# Patient Record
Sex: MALE | Race: WHITE | Employment: UNEMPLOYED | URBAN - METROPOLITAN AREA
[De-identification: names, ages, dates, MRNs, and addresses within clinical notes are randomized per-mention and may not be internally consistent; named-entity substitution may affect disease eponyms.]

---

## 2022-06-26 ENCOUNTER — APPOINTMENT (OUTPATIENT)
Dept: GENERAL RADIOLOGY | Age: 42
DRG: 283 | End: 2022-06-26
Payer: MEDICAID

## 2022-06-26 ENCOUNTER — HOSPITAL ENCOUNTER (INPATIENT)
Age: 42
LOS: 1 days | Discharge: ANOTHER HOSPITAL | DRG: 283 | End: 2022-06-27
Attending: EMERGENCY MEDICINE | Admitting: INTERNAL MEDICINE
Payer: MEDICAID

## 2022-06-26 VITALS
HEART RATE: 59 BPM | DIASTOLIC BLOOD PRESSURE: 72 MMHG | HEIGHT: 67 IN | TEMPERATURE: 98.5 F | SYSTOLIC BLOOD PRESSURE: 113 MMHG | OXYGEN SATURATION: 100 % | BODY MASS INDEX: 25.9 KG/M2 | WEIGHT: 165 LBS | RESPIRATION RATE: 16 BRPM

## 2022-06-26 DIAGNOSIS — R10.10 UPPER ABDOMINAL PAIN: ICD-10-CM

## 2022-06-26 DIAGNOSIS — B17.9 ACUTE HEPATITIS: Primary | ICD-10-CM

## 2022-06-26 DIAGNOSIS — R74.01 TRANSAMINITIS: ICD-10-CM

## 2022-06-26 DIAGNOSIS — R11.10 VOMITING IN ADULT: ICD-10-CM

## 2022-06-26 DIAGNOSIS — R17 SCLERAL ICTERUS: ICD-10-CM

## 2022-06-26 PROBLEM — K75.9 HEPATITIS: Status: ACTIVE | Noted: 2022-06-26

## 2022-06-26 LAB
A/G RATIO: 1.4 (ref 1.1–2.2)
ACETAMINOPHEN LEVEL: <5 UG/ML (ref 10–30)
ALBUMIN SERPL-MCNC: 3.8 G/DL (ref 3.4–5)
ALP BLD-CCNC: 339 U/L (ref 40–129)
ALT SERPL-CCNC: 1754 U/L (ref 10–40)
ANION GAP SERPL CALCULATED.3IONS-SCNC: 10 MMOL/L (ref 3–16)
AST SERPL-CCNC: 937 U/L (ref 15–37)
BASOPHILS ABSOLUTE: 0.1 K/UL (ref 0–0.2)
BASOPHILS RELATIVE PERCENT: 0.8 %
BILIRUB SERPL-MCNC: 9.8 MG/DL (ref 0–1)
BILIRUBIN DIRECT: 7.8 MG/DL (ref 0–0.3)
BILIRUBIN URINE: ABNORMAL
BILIRUBIN, INDIRECT: 2 MG/DL (ref 0–1)
BLOOD, URINE: NEGATIVE
BUN BLDV-MCNC: 9 MG/DL (ref 7–20)
CALCIUM SERPL-MCNC: 8.9 MG/DL (ref 8.3–10.6)
CHLORIDE BLD-SCNC: 104 MMOL/L (ref 99–110)
CLARITY: CLEAR
CO2: 23 MMOL/L (ref 21–32)
COLOR: ABNORMAL
CREAT SERPL-MCNC: <0.5 MG/DL (ref 0.9–1.3)
EOSINOPHILS ABSOLUTE: 0.4 K/UL (ref 0–0.6)
EOSINOPHILS RELATIVE PERCENT: 5 %
GFR AFRICAN AMERICAN: >60
GFR NON-AFRICAN AMERICAN: >60
GLUCOSE BLD-MCNC: 111 MG/DL (ref 70–99)
GLUCOSE URINE: NEGATIVE MG/DL
HCT VFR BLD CALC: 42 % (ref 40.5–52.5)
HEMOGLOBIN: 14.4 G/DL (ref 13.5–17.5)
INR BLD: 1.08 (ref 0.87–1.14)
KETONES, URINE: NEGATIVE MG/DL
LEUKOCYTE ESTERASE, URINE: NEGATIVE
LIPASE: 33 U/L (ref 13–60)
LYMPHOCYTES ABSOLUTE: 1.4 K/UL (ref 1–5.1)
LYMPHOCYTES RELATIVE PERCENT: 16.7 %
MCH RBC QN AUTO: 31.1 PG (ref 26–34)
MCHC RBC AUTO-ENTMCNC: 34.4 G/DL (ref 31–36)
MCV RBC AUTO: 90.5 FL (ref 80–100)
MICROSCOPIC EXAMINATION: ABNORMAL
MONOCYTES ABSOLUTE: 0.8 K/UL (ref 0–1.3)
MONOCYTES RELATIVE PERCENT: 9.3 %
NEUTROPHILS ABSOLUTE: 5.6 K/UL (ref 1.7–7.7)
NEUTROPHILS RELATIVE PERCENT: 68.2 %
NITRITE, URINE: NEGATIVE
PDW BLD-RTO: 16.4 % (ref 12.4–15.4)
PH UA: 6.5 (ref 5–8)
PLATELET # BLD: 282 K/UL (ref 135–450)
PMV BLD AUTO: 7.8 FL (ref 5–10.5)
POTASSIUM REFLEX MAGNESIUM: 3.8 MMOL/L (ref 3.5–5.1)
PROTEIN UA: NEGATIVE MG/DL
PROTHROMBIN TIME: 13.9 SEC (ref 11.7–14.5)
RBC # BLD: 4.64 M/UL (ref 4.2–5.9)
SARS-COV-2, NAAT: NOT DETECTED
SODIUM BLD-SCNC: 137 MMOL/L (ref 136–145)
SPECIFIC GRAVITY UA: 1.02 (ref 1–1.03)
TOTAL PROTEIN: 6.6 G/DL (ref 6.4–8.2)
TROPONIN: <0.01 NG/ML
URINE TYPE: ABNORMAL
UROBILINOGEN, URINE: 1 E.U./DL
WBC # BLD: 8.2 K/UL (ref 4–11)

## 2022-06-26 PROCEDURE — 80143 DRUG ASSAY ACETAMINOPHEN: CPT

## 2022-06-26 PROCEDURE — 87341 HEP B SURFACE AG NEUTRLZJ IA: CPT

## 2022-06-26 PROCEDURE — 85025 COMPLETE CBC W/AUTO DIFF WBC: CPT

## 2022-06-26 PROCEDURE — 2500000003 HC RX 250 WO HCPCS: Performed by: PHYSICIAN ASSISTANT

## 2022-06-26 PROCEDURE — 93005 ELECTROCARDIOGRAM TRACING: CPT | Performed by: PHYSICIAN ASSISTANT

## 2022-06-26 PROCEDURE — 71045 X-RAY EXAM CHEST 1 VIEW: CPT

## 2022-06-26 PROCEDURE — G0378 HOSPITAL OBSERVATION PER HR: HCPCS

## 2022-06-26 PROCEDURE — 80074 ACUTE HEPATITIS PANEL: CPT

## 2022-06-26 PROCEDURE — 6360000002 HC RX W HCPCS: Performed by: PHYSICIAN ASSISTANT

## 2022-06-26 PROCEDURE — 85610 PROTHROMBIN TIME: CPT

## 2022-06-26 PROCEDURE — 96375 TX/PRO/DX INJ NEW DRUG ADDON: CPT

## 2022-06-26 PROCEDURE — 84484 ASSAY OF TROPONIN QUANT: CPT

## 2022-06-26 PROCEDURE — 81003 URINALYSIS AUTO W/O SCOPE: CPT

## 2022-06-26 PROCEDURE — 99285 EMERGENCY DEPT VISIT HI MDM: CPT

## 2022-06-26 PROCEDURE — 96374 THER/PROPH/DIAG INJ IV PUSH: CPT

## 2022-06-26 PROCEDURE — 83690 ASSAY OF LIPASE: CPT

## 2022-06-26 PROCEDURE — 36415 COLL VENOUS BLD VENIPUNCTURE: CPT

## 2022-06-26 PROCEDURE — 2580000003 HC RX 258: Performed by: PHYSICIAN ASSISTANT

## 2022-06-26 PROCEDURE — 1200000000 HC SEMI PRIVATE

## 2022-06-26 PROCEDURE — 87635 SARS-COV-2 COVID-19 AMP PRB: CPT

## 2022-06-26 PROCEDURE — 96361 HYDRATE IV INFUSION ADD-ON: CPT

## 2022-06-26 PROCEDURE — 80053 COMPREHEN METABOLIC PANEL: CPT

## 2022-06-26 RX ORDER — ENOXAPARIN SODIUM 100 MG/ML
40 INJECTION SUBCUTANEOUS DAILY
Status: CANCELLED | OUTPATIENT
Start: 2022-06-26

## 2022-06-26 RX ORDER — 0.9 % SODIUM CHLORIDE 0.9 %
1000 INTRAVENOUS SOLUTION INTRAVENOUS ONCE
Status: COMPLETED | OUTPATIENT
Start: 2022-06-26 | End: 2022-06-26

## 2022-06-26 RX ORDER — SODIUM CHLORIDE 9 MG/ML
INJECTION, SOLUTION INTRAVENOUS PRN
Status: CANCELLED | OUTPATIENT
Start: 2022-06-26

## 2022-06-26 RX ORDER — FAMOTIDINE 10 MG/ML
20 INJECTION, SOLUTION INTRAVENOUS ONCE
Status: COMPLETED | OUTPATIENT
Start: 2022-06-26 | End: 2022-06-26

## 2022-06-26 RX ORDER — SODIUM CHLORIDE 0.9 % (FLUSH) 0.9 %
5-40 SYRINGE (ML) INJECTION EVERY 12 HOURS SCHEDULED
Status: CANCELLED | OUTPATIENT
Start: 2022-06-26

## 2022-06-26 RX ORDER — POLYETHYLENE GLYCOL 3350 17 G/17G
17 POWDER, FOR SOLUTION ORAL DAILY PRN
Status: CANCELLED | OUTPATIENT
Start: 2022-06-26

## 2022-06-26 RX ORDER — SODIUM CHLORIDE 0.9 % (FLUSH) 0.9 %
5-40 SYRINGE (ML) INJECTION PRN
Status: CANCELLED | OUTPATIENT
Start: 2022-06-26

## 2022-06-26 RX ORDER — ONDANSETRON 4 MG/1
4 TABLET, ORALLY DISINTEGRATING ORAL EVERY 8 HOURS PRN
Status: CANCELLED | OUTPATIENT
Start: 2022-06-26

## 2022-06-26 RX ORDER — ONDANSETRON 2 MG/ML
4 INJECTION INTRAMUSCULAR; INTRAVENOUS EVERY 6 HOURS PRN
Status: CANCELLED | OUTPATIENT
Start: 2022-06-26

## 2022-06-26 RX ORDER — ONDANSETRON 2 MG/ML
4 INJECTION INTRAMUSCULAR; INTRAVENOUS ONCE
Status: COMPLETED | OUTPATIENT
Start: 2022-06-26 | End: 2022-06-26

## 2022-06-26 RX ADMIN — FAMOTIDINE 20 MG: 10 INJECTION, SOLUTION INTRAVENOUS at 16:37

## 2022-06-26 RX ADMIN — SODIUM CHLORIDE 1000 ML: 9 INJECTION, SOLUTION INTRAVENOUS at 16:37

## 2022-06-26 RX ADMIN — ONDANSETRON HYDROCHLORIDE 4 MG: 2 INJECTION, SOLUTION INTRAMUSCULAR; INTRAVENOUS at 16:37

## 2022-06-26 ASSESSMENT — ENCOUNTER SYMPTOMS
ABDOMINAL PAIN: 1
BLOOD IN STOOL: 0
NAUSEA: 1
SHORTNESS OF BREATH: 0
DIARRHEA: 0
VOMITING: 1

## 2022-06-26 ASSESSMENT — PAIN SCALES - GENERAL: PAINLEVEL_OUTOF10: 7

## 2022-06-26 ASSESSMENT — PAIN - FUNCTIONAL ASSESSMENT: PAIN_FUNCTIONAL_ASSESSMENT: 0-10

## 2022-06-26 ASSESSMENT — PAIN DESCRIPTION - LOCATION: LOCATION: ABDOMEN

## 2022-06-26 NOTE — ED NOTES
Report called to Naun [facility] ED charge RN also called and given update on patient status.       Jalyn Jones RN  06/26/22 5684

## 2022-06-26 NOTE — ED PROVIDER NOTES
I independently performed a history and physical on Kelle Murdock. All diagnostic, treatment, and disposition decisions were made by myself in conjunction with the advanced practice provider. I have participated in the medical decision making and directed the treatment plan and disposition of the patient. For further details of [facility]anna [facility] emergency department encounter, please see the advanced practice provider's documentation. CHIEF COMPLAINT  Chief Complaint   Patient presents with    Abdominal Pain     seen at [facility] ER told he was having \"liver issues\" due to make appt with GI doctor, states his skin is turning yellow and abd pain is increasing. Briefly, Kelle Murdock is a 43 y.o. male  who presents to the ED complaining of abdominal pain nausea yellow skin    FOCUSED PHYSICAL EXAMINATION  /63   Pulse 57   Temp 98.5 °F (36.9 °C) (Oral)   Resp 16   Ht 5' 7\" (1.702 m)   Wt 165 lb (74.8 kg)   SpO2 100%   BMI 25.84 kg/m²      Focused physical examination:  General appearance:  Cooperative. No acute distress. Skin:  Warm. Dry. Jaundice skin  Eye:  Extraocular movements intact. Scleral icterus  Ears, nose, mouth and throat:  Oral mucosa moist,  Neck:  Trachea midline. Heart:  Regular rate and rhythm  Perfusion:  intact  Respiratory:  Lungs clear to auscultation bilaterally. Respirations nonlabored. Abdominal:   Non distended. Mild right upper quadrant tenderness  Neurological:  Alert and oriented x 3. Moves all extremities spontaneously  Musculoskeletal:   Normal ROM, no deformities          Psychiatric:  Normal mood    MDM: Patient presents today with new onset jaundice. Outpatient CT scan performed only days ago shows hepatosplenomegaly. Laboratory studies no elevated bilirubin and liver function studies. Outpatient hepatitis screen shows likely acute hepatitis B infection as underlying etiology.   Contact GI and plan to likely admit    During the patient's ED course, the patient was given:  Medications   0.9 % sodium chloride bolus (1,000 mLs IntraVENous New Bag 6/26/22 1637)   ondansetron (ZOFRAN) injection 4 mg (4 mg IntraVENous Given 6/26/22 1637)   famotidine (PEPCID) injection 20 mg (20 mg IntraVENous Given 6/26/22 1637)      This chart was created using Dragon dictation software. Efforts were made by me to ensure accuracy, however some errors may be present due to limitations of this technology.             Rosalee Koo MD  06/26/22 0866

## 2022-06-26 NOTE — ED NOTES
Pt does not want to wait for transport due to extreme long wait (after 0300) pt is going to go home and  someone then go to hospitl. IV removed.       Gaudencio Perry RN  06/26/22 1245

## 2022-06-27 ENCOUNTER — APPOINTMENT (OUTPATIENT)
Dept: CT IMAGING | Age: 42
End: 2022-06-27
Payer: MEDICAID

## 2022-06-27 ENCOUNTER — HOSPITAL ENCOUNTER (OUTPATIENT)
Age: 42
Setting detail: OBSERVATION
Discharge: HOME | End: 2022-06-30
Attending: EMERGENCY MEDICINE | Admitting: INTERNAL MEDICINE
Payer: MEDICAID

## 2022-06-27 ENCOUNTER — APPOINTMENT (OUTPATIENT)
Dept: ULTRASOUND IMAGING | Age: 42
End: 2022-06-27
Payer: MEDICAID

## 2022-06-27 ENCOUNTER — APPOINTMENT (OUTPATIENT)
Dept: GENERAL RADIOLOGY | Age: 42
End: 2022-06-27
Payer: MEDICAID

## 2022-06-27 DIAGNOSIS — R79.89 ABNORMAL LFTS: Primary | ICD-10-CM

## 2022-06-27 DIAGNOSIS — R17 JAUNDICE, NON-NEONATAL: ICD-10-CM

## 2022-06-27 DIAGNOSIS — B16.9: ICD-10-CM

## 2022-06-27 DIAGNOSIS — B17.9 ACUTE HEPATITIS: ICD-10-CM

## 2022-06-27 LAB
A/G RATIO: 1.4 (ref 1.1–2.2)
ALBUMIN SERPL-MCNC: 3.8 G/DL (ref 3.4–5)
ALP BLD-CCNC: 317 U/L (ref 40–129)
ALT SERPL-CCNC: 1770 U/L (ref 10–40)
AMMONIA: 21 UMOL/L (ref 16–60)
ANION GAP SERPL CALCULATED.3IONS-SCNC: 10 MMOL/L (ref 3–16)
APTT: 39.1 SEC (ref 23–34.3)
AST SERPL-CCNC: 878 U/L (ref 15–37)
BASOPHILS ABSOLUTE: 0 K/UL (ref 0–0.2)
BASOPHILS RELATIVE PERCENT: 0.6 %
BILIRUB SERPL-MCNC: 9.7 MG/DL (ref 0–1)
BUN BLDV-MCNC: 10 MG/DL (ref 7–20)
CALCIUM SERPL-MCNC: 8.6 MG/DL (ref 8.3–10.6)
CHLORIDE BLD-SCNC: 106 MMOL/L (ref 99–110)
CO2: 23 MMOL/L (ref 21–32)
CREAT SERPL-MCNC: 0.6 MG/DL (ref 0.9–1.3)
EKG ATRIAL RATE: 45 BPM
EKG DIAGNOSIS: NORMAL
EKG P AXIS: 34 DEGREES
EKG P-R INTERVAL: 158 MS
EKG Q-T INTERVAL: 432 MS
EKG QRS DURATION: 108 MS
EKG QTC CALCULATION (BAZETT): 373 MS
EKG R AXIS: 77 DEGREES
EKG T AXIS: 34 DEGREES
EKG VENTRICULAR RATE: 45 BPM
EOSINOPHILS ABSOLUTE: 0.3 K/UL (ref 0–0.6)
EOSINOPHILS RELATIVE PERCENT: 4.5 %
ETHANOL: NORMAL MG/DL (ref 0–0.08)
GFR AFRICAN AMERICAN: >60
GFR NON-AFRICAN AMERICAN: >60
GLUCOSE BLD-MCNC: 82 MG/DL (ref 70–99)
HAV IGM SER IA-ACNC: ABNORMAL
HCT VFR BLD CALC: 41.3 % (ref 40.5–52.5)
HEMOGLOBIN: 13.9 G/DL (ref 13.5–17.5)
HEPATITIS B CORE IGM ANTIBODY: REACTIVE
HEPATITIS B SURFACE ANTIGEN INTERPRETATION: REACTIVE
HEPATITIS C ANTIBODY INTERPRETATION: ABNORMAL
INFLUENZA A: NOT DETECTED
INFLUENZA B: NOT DETECTED
INR BLD: 1.06 (ref 0.87–1.14)
LACTIC ACID: 0.9 MMOL/L (ref 0.4–2)
LIPASE: 28 U/L (ref 13–60)
LYMPHOCYTES ABSOLUTE: 1.3 K/UL (ref 1–5.1)
LYMPHOCYTES RELATIVE PERCENT: 16.9 %
MCH RBC QN AUTO: 30.6 PG (ref 26–34)
MCHC RBC AUTO-ENTMCNC: 33.7 G/DL (ref 31–36)
MCV RBC AUTO: 91 FL (ref 80–100)
MONOCYTES ABSOLUTE: 0.9 K/UL (ref 0–1.3)
MONOCYTES RELATIVE PERCENT: 11.2 %
NEUTROPHILS ABSOLUTE: 5.1 K/UL (ref 1.7–7.7)
NEUTROPHILS RELATIVE PERCENT: 66.8 %
PDW BLD-RTO: 17.1 % (ref 12.4–15.4)
PLATELET # BLD: 264 K/UL (ref 135–450)
PMV BLD AUTO: 8.3 FL (ref 5–10.5)
POTASSIUM REFLEX MAGNESIUM: 3.8 MMOL/L (ref 3.5–5.1)
PROTHROMBIN TIME: 13.6 SEC (ref 11.7–14.5)
RBC # BLD: 4.54 M/UL (ref 4.2–5.9)
SARS-COV-2 RNA, RT PCR: NOT DETECTED
SODIUM BLD-SCNC: 139 MMOL/L (ref 136–145)
TOTAL PROTEIN: 6.5 G/DL (ref 6.4–8.2)
WBC # BLD: 7.7 K/UL (ref 4–11)

## 2022-06-27 PROCEDURE — 76705 ECHO EXAM OF ABDOMEN: CPT

## 2022-06-27 PROCEDURE — 74177 CT ABD & PELVIS W/CONTRAST: CPT

## 2022-06-27 PROCEDURE — 82077 ASSAY SPEC XCP UR&BREATH IA: CPT

## 2022-06-27 PROCEDURE — 99285 EMERGENCY DEPT VISIT HI MDM: CPT

## 2022-06-27 PROCEDURE — 93010 ELECTROCARDIOGRAM REPORT: CPT | Performed by: INTERNAL MEDICINE

## 2022-06-27 PROCEDURE — 83690 ASSAY OF LIPASE: CPT

## 2022-06-27 PROCEDURE — 82140 ASSAY OF AMMONIA: CPT

## 2022-06-27 PROCEDURE — 6360000004 HC RX CONTRAST MEDICATION: Performed by: NURSE PRACTITIONER

## 2022-06-27 PROCEDURE — 87636 SARSCOV2 & INF A&B AMP PRB: CPT

## 2022-06-27 PROCEDURE — 83605 ASSAY OF LACTIC ACID: CPT

## 2022-06-27 PROCEDURE — 2580000003 HC RX 258: Performed by: INTERNAL MEDICINE

## 2022-06-27 PROCEDURE — 85025 COMPLETE CBC W/AUTO DIFF WBC: CPT

## 2022-06-27 PROCEDURE — 80053 COMPREHEN METABOLIC PANEL: CPT

## 2022-06-27 PROCEDURE — 6360000002 HC RX W HCPCS: Performed by: NURSE PRACTITIONER

## 2022-06-27 PROCEDURE — 96375 TX/PRO/DX INJ NEW DRUG ADDON: CPT

## 2022-06-27 PROCEDURE — G0378 HOSPITAL OBSERVATION PER HR: HCPCS

## 2022-06-27 PROCEDURE — 85730 THROMBOPLASTIN TIME PARTIAL: CPT

## 2022-06-27 PROCEDURE — 2580000003 HC RX 258: Performed by: NURSE PRACTITIONER

## 2022-06-27 PROCEDURE — 36415 COLL VENOUS BLD VENIPUNCTURE: CPT

## 2022-06-27 PROCEDURE — 96365 THER/PROPH/DIAG IV INF INIT: CPT

## 2022-06-27 PROCEDURE — 71045 X-RAY EXAM CHEST 1 VIEW: CPT

## 2022-06-27 PROCEDURE — 85610 PROTHROMBIN TIME: CPT

## 2022-06-27 RX ORDER — 0.9 % SODIUM CHLORIDE 0.9 %
1000 INTRAVENOUS SOLUTION INTRAVENOUS ONCE
Status: COMPLETED | OUTPATIENT
Start: 2022-06-27 | End: 2022-06-27

## 2022-06-27 RX ORDER — ONDANSETRON 2 MG/ML
4 INJECTION INTRAMUSCULAR; INTRAVENOUS EVERY 6 HOURS PRN
Status: DISCONTINUED | OUTPATIENT
Start: 2022-06-27 | End: 2022-06-30 | Stop reason: HOSPADM

## 2022-06-27 RX ORDER — ONDANSETRON 2 MG/ML
4 INJECTION INTRAMUSCULAR; INTRAVENOUS ONCE
Status: COMPLETED | OUTPATIENT
Start: 2022-06-27 | End: 2022-06-27

## 2022-06-27 RX ORDER — ENOXAPARIN SODIUM 100 MG/ML
40 INJECTION SUBCUTANEOUS DAILY
Status: DISCONTINUED | OUTPATIENT
Start: 2022-06-28 | End: 2022-06-30 | Stop reason: HOSPADM

## 2022-06-27 RX ORDER — SODIUM CHLORIDE, SODIUM LACTATE, POTASSIUM CHLORIDE, CALCIUM CHLORIDE 600; 310; 30; 20 MG/100ML; MG/100ML; MG/100ML; MG/100ML
INJECTION, SOLUTION INTRAVENOUS CONTINUOUS
Status: DISCONTINUED | OUTPATIENT
Start: 2022-06-27 | End: 2022-06-30 | Stop reason: HOSPADM

## 2022-06-27 RX ORDER — SODIUM CHLORIDE 0.9 % (FLUSH) 0.9 %
5-40 SYRINGE (ML) INJECTION PRN
Status: DISCONTINUED | OUTPATIENT
Start: 2022-06-27 | End: 2022-06-30 | Stop reason: HOSPADM

## 2022-06-27 RX ORDER — SODIUM CHLORIDE 9 MG/ML
INJECTION, SOLUTION INTRAVENOUS PRN
Status: DISCONTINUED | OUTPATIENT
Start: 2022-06-27 | End: 2022-06-30 | Stop reason: HOSPADM

## 2022-06-27 RX ORDER — SODIUM CHLORIDE 0.9 % (FLUSH) 0.9 %
5-40 SYRINGE (ML) INJECTION EVERY 12 HOURS SCHEDULED
Status: DISCONTINUED | OUTPATIENT
Start: 2022-06-27 | End: 2022-06-30 | Stop reason: HOSPADM

## 2022-06-27 RX ADMIN — SODIUM CHLORIDE 1000 ML: 9 INJECTION, SOLUTION INTRAVENOUS at 22:06

## 2022-06-27 RX ADMIN — PIPERACILLIN AND TAZOBACTAM 3375 MG: 3; .375 INJECTION, POWDER, LYOPHILIZED, FOR SOLUTION INTRAVENOUS at 22:20

## 2022-06-27 RX ADMIN — IOPAMIDOL 75 ML: 755 INJECTION, SOLUTION INTRAVENOUS at 19:39

## 2022-06-27 RX ADMIN — ONDANSETRON HYDROCHLORIDE 4 MG: 2 INJECTION, SOLUTION INTRAMUSCULAR; INTRAVENOUS at 22:06

## 2022-06-27 RX ADMIN — SODIUM CHLORIDE, POTASSIUM CHLORIDE, SODIUM LACTATE AND CALCIUM CHLORIDE: 600; 310; 30; 20 INJECTION, SOLUTION INTRAVENOUS at 23:16

## 2022-06-27 RX ADMIN — Medication 10 ML: at 23:16

## 2022-06-27 ASSESSMENT — ENCOUNTER SYMPTOMS
COLOR CHANGE: 1
DIARRHEA: 0
SORE THROAT: 0
COUGH: 0
BLOOD IN STOOL: 0
BACK PAIN: 0
VOMITING: 0
ABDOMINAL PAIN: 1
SHORTNESS OF BREATH: 0
NAUSEA: 0
EYE PAIN: 0
RHINORRHEA: 0

## 2022-06-27 ASSESSMENT — LIFESTYLE VARIABLES
HOW MANY STANDARD DRINKS CONTAINING ALCOHOL DO YOU HAVE ON A TYPICAL DAY: 3 OR 4
HOW MANY STANDARD DRINKS CONTAINING ALCOHOL DO YOU HAVE ON A TYPICAL DAY: 3 OR 4
HOW OFTEN DO YOU HAVE A DRINK CONTAINING ALCOHOL: MONTHLY OR LESS
HOW OFTEN DO YOU HAVE A DRINK CONTAINING ALCOHOL: MONTHLY OR LESS

## 2022-06-27 ASSESSMENT — PAIN DESCRIPTION - LOCATION: LOCATION: ABDOMEN

## 2022-06-27 ASSESSMENT — PAIN SCALES - GENERAL: PAINLEVEL_OUTOF10: 7

## 2022-06-27 ASSESSMENT — PAIN - FUNCTIONAL ASSESSMENT: PAIN_FUNCTIONAL_ASSESSMENT: 0-10

## 2022-06-27 NOTE — PROGRESS NOTES
Patient has never arrived to [facility]. Attempted phone call but no voicemail has been set up. Spoke with Dr. Arpit Keenan, if patient shows up he will need to be re evaluated in the ED since it has been 12hours since admit orders.

## 2022-06-27 NOTE — ED PROVIDER NOTES
[address] 50        Pt Name: Amberly Bedolla  MRN: [id number]  Demetriagfgino 1980  Date of evaluation: 6/27/2022  Provider: TIBURCIO Car CNP  PCP: No primary care provider on file. Note Started: 5:49 PM EDT      ELAINE. I have evaluated this patient. My supervising physician was available for consultation. Triage CHIEF COMPLAINT       Chief Complaint   Patient presents with    Jaundice     pt reports was sent from [facility] last night for direct admit. states went back to the house and did not have gas to get back to [place]. dx with hepatitis, jaundice          HISTORY OF PRESENT ILLNESS   (Location/Symptom, Timing/Onset, Context/Setting, Quality, Duration, Modifying Factors, Severity)  Note limiting factors. Chief Complaint: Jaundice and abdominal pain    Art Alvarado is a 89[address] y.o. male who presents to the emerge department symptoms of jaundice and abdominal pain. The patient reported the symptoms of jaundice began about a week ago. He reported that he since has had symptoms of right upper quadrant abdominal pain. Reported that he was seen at [facility] [place] and they had provided him transfer here to be evaluated. The patient since then has not shown up for his hospitalization and refusing this transportation. States he is here now to be admitted. He does have a history of COPD and is a current smoker. Denies any history of excessive alcohol use but does occasionally drink alcohol. .  Denies history of IV drug use. Denies any symptoms of chest pain or shortness of breath. No back pain or flank pain. Nursing Notes were all reviewed and agreed with or any disagreements were addressed in the HPI. REVIEW OF SYSTEMS    (2-9 systems for level 4, 10 or more for level 5)     Review of Systems   Constitutional: Negative for chills, diaphoresis and fever. HENT: Negative for congestion, ear pain, rhinorrhea and sore throat.     Eyes: Not on file    Lack of Transportation (Non-Medical): Not on file   Physical Activity:     Days of Exercise per Week: Not on file    Minutes of Exercise per Session: Not on file   Stress:     Feeling of Stress : Not on file   Social Connections:     Frequency of Communication with Friends and Family: Not on file    Frequency of Social Gatherings with Friends and Family: Not on file    Attends Congregational Services: Not on file    Active Member of [employer] or Organizations: Not on file    Attends Club or Organization Meetings: Not on file    Marital Status: Not on file   Intimate Partner Violence:     Fear of Current or Ex-Partner: Not on file    Emotionally Abused: Not on file    Physically Abused: Not on file    Sexually Abused: Not on file   Housing Stability:     Unable to Pay for Housing in the Last Year: Not on file    Number of [place] in the Last Year: Not on file    Unstable Housing in the Last Year: Not on file       SCREENINGS    [place] Coma Scale  Eye Opening: Spontaneous  Best Verbal Response: Oriented  Best Motor Response: Obeys commands  [place] Coma Scale Score: 15        PHYSICAL EXAM    (up to 7 for level 4, 8 or more for level 5)     ED Triage Vitals [06/27/22 1619]   BP Temp Temp Source Heart Rate Resp SpO2 Height Weight   117/71 98.4 °F (36.9 °C) Oral 66 16 100 % 5' 7\" (1.702 m) 165 lb (74.8 kg)       Physical Exam  Vitals and nursing note reviewed. Constitutional:       Appearance: Normal appearance. He is not toxic-appearing or diaphoretic. HENT:      Head: Normocephalic and atraumatic. Nose: Nose normal.   Eyes:      General: Scleral icterus present. Right eye: No discharge. Left eye: No discharge. Cardiovascular:      Rate and Rhythm: Normal rate and regular rhythm. Pulses: Normal pulses. Heart sounds: No murmur heard. Pulmonary:      Effort: Pulmonary effort is normal. No respiratory distress. Breath sounds:  No wheezing or rhonchi. Abdominal:      Palpations: Abdomen is soft. Tenderness: There is abdominal tenderness in the right upper quadrant. There is no guarding or rebound. Musculoskeletal:         General: Normal range of motion. Cervical back: Normal range of motion and neck supple. Skin:     General: Skin is warm and dry. Coloration: Skin is jaundiced. Neurological:      General: No focal deficit present. Mental Status: He is alert and oriented to person, place, and time. Psychiatric:         Mood and Affect: Mood normal.         Behavior: Behavior normal.         DIAGNOSTIC RESULTS   LABS:    Labs Reviewed   CBC WITH AUTO DIFFERENTIAL - Abnormal; Notable for the following components:       Result Value    RDW 17.1 (*)     All other components within normal limits   COMPREHENSIVE METABOLIC PANEL W/ REFLEX TO MG FOR LOW K - Abnormal; Notable for the following components:    CREATININE 0.6 (*)     Total Bilirubin 9.7 (*)     Alkaline Phosphatase 317 (*)     ALT 1,770 (*)      (*)     All other components within normal limits   APTT - Abnormal; Notable for the following components:    aPTT 39.1 (*)     All other components within normal limits   HEPATIC FUNCTION PANEL - Abnormal; Notable for the following components:     Total Protein 5.9 (*)     Alkaline Phosphatase 295 (*)     ALT 1,638 (*)      (*)     Total Bilirubin 9.8 (*)     Bilirubin, Direct 7.7 (*)     Bilirubin, Indirect 2.1 (*)     All other components within normal limits   HEPATITIS B, QUANTITATIVE, MOLECULAR - Abnormal; Notable for the following components:    Interpretation Detected (*)     All other components within normal limits   COMPREHENSIVE METABOLIC PANEL W/ REFLEX TO MG FOR LOW K - Abnormal; Notable for the following components:    Glucose 108 (*)     CREATININE <0.5 (*)     Total Protein 6.0 (*)     Albumin 3.3 (*)     Total Bilirubin 10.4 (*)     Alkaline Phosphatase 288 (*)     ALT 1,598 (*)     AST 1,032 (*) All other components within normal limits   CBC WITH AUTO DIFFERENTIAL - Abnormal; Notable for the following components:    Hematocrit 38.4 (*)     RDW 16.9 (*)     All other components within normal limits   COMPREHENSIVE METABOLIC PANEL W/ REFLEX TO MG FOR LOW K - Abnormal; Notable for the following components:    CREATININE <0.5 (*)     Total Protein 6.3 (*)     Total Bilirubin 11.8 (*)     Alkaline Phosphatase 290 (*)     ALT 1,757 (*)     AST 1,120 (*)     All other components within normal limits   COVID-19 & INFLUENZA COMBO   LIPASE   PROTIME-INR   LACTIC ACID   ETHANOL   AMMONIA   URINALYSIS WITH REFLEX TO CULTURE   URINE DRUG SCREEN       When ordered, only abnormal lab results are displayed. All other labs were within normal range or not returned as of this dictation. EKG: When ordered, EKG's are interpreted by the Emergency Department Physician in the absence of a cardiologist.  Please see their note for interpretation of EKG. RADIOLOGY:   Non-plain film images such as CT, Ultrasound and MRI are read by the radiologist. Plain radiographic images are visualized andpreliminarily interpreted by the  ED Provider with the below findings:        Interpretation perthe Radiologist below, if available at the time of this note:    CT ABDOMEN PELVIS W IV CONTRAST Additional Contrast? None   Final Result   CT findings in correlation with the prior gallbladder ultrasound findings are   highly suspicious for acute acalculous cholecystitis. Clinical correlation   and follow-up nuclear medicine hepatobiliary imaging study would be helpful. XR CHEST PORTABLE   Final Result   No acute process. US GALLBLADDER RUQ   Final Result   Gallbladder wall thickening with possible gallbladder wall polyps, no   definite gallstones. No pericholecystic fluid. Negative sonographic   Ruth's sign. Common bile duct not visualized           No results found.       PROCEDURES   Unless otherwise noted below, none     Procedures    CRITICAL CARE TIME   N/A    CONSULTS:  IP CONSULT TO HOSPITALIST  IP CONSULT TO GI      EMERGENCY DEPARTMENT COURSE and DIFFERENTIAL DIAGNOSIS/MDM:   Vitals:    Vitals:    06/29/22 2013 06/30/22 0220 06/30/22 0845 06/30/22 1416   BP: 121/68 101/60 (!) 107/58 111/67   Pulse:  52 53 61   Resp: 16 16 16 16   Temp: 97.1 °F (36.2 °C) 98 °F (36.7 °C) 96.9 °F (36.1 °C) 97 °F (36.1 °C)   TempSrc: Oral Oral Oral Oral   SpO2: 100% 96% 96% 98%   Weight:       Height:           Patient was given thefollowing medications:  Medications   sodium chloride flush 0.9 % injection 5-40 mL (10 mLs IntraVENous Given 6/30/22 0901)   sodium chloride flush 0.9 % injection 5-40 mL (has no administration in time range)   0.9 % sodium chloride infusion (has no administration in time range)   enoxaparin (LOVENOX) injection 40 mg (40 mg SubCUTAneous Not Given 6/30/22 0901)   lactated ringers infusion (0 mLs IntraVENous Stopped 6/30/22 1347)   ondansetron (ZOFRAN) injection 4 mg (4 mg IntraVENous Given 6/28/22 2232)   pantoprazole (PROTONIX) tablet 40 mg (40 mg Oral Given 6/30/22 0611)   ALPRAZolam (XANAX) tablet 0.5 mg (0.5 mg Oral Given 6/29/22 2230)   0.9 % sodium chloride bolus (0 mLs IntraVENous Stopped 6/27/22 2307)   iopamidol (ISOVUE-370) 76 % injection 75 mL (75 mLs IntraVENous Given 6/27/22 1939)   piperacillin-tazobactam (ZOSYN) 3,375 mg in sodium chloride 0.9 % 100 mL IVPB (mini-bag) (0 mg IntraVENous Stopped 6/27/22 2307)   ondansetron (ZOFRAN) injection 4 mg (4 mg IntraVENous Given 6/27/22 2206)   aluminum & magnesium hydroxide-simethicone (MAALOX) 30 mL, lidocaine viscous hcl (XYLOCAINE) 5 mL (GI COCKTAIL) ( Oral Given 6/30/22 5120)         Is this patient to be included in the SEP-1 Core Measure due to severe sepsis or septic shock? No   Exclusion criteria - the patient is NOT to be included for SEP-1 Core Measure due to:  2+ SIRS criteria are not met    I spoke with DR. Shah and at this time we believe this is likely due to inflammation involving the liver from his hepatitis B. Plan is at this time he will be evaluated further in the hospital and medicine type therapies. GI was consulted last night and they advised they wanted him admitted. At this time I believe that is a safe option. No other acute complaints at this time and will continue the plan for admission to hospitalist.     FINAL IMPRESSION      1. Abnormal LFTs    2.  Acute type B viral hepatitis          DISPOSITION/PLAN   DISPOSITION Admitted 06/27/2022 09:50:53 PM      PATIENT REFERREDTO:  Alexus Cobos [address] [facility] [phone]  Schedule an appointment as soon as possible for a visit in 1 week  follow up in 1-2 weeks to be established with PCP    Alexsander Landrum, [address] [facility], [address] [place] [address] [phone]    Schedule an appointment as soon as possible for a visit in 5 days  call to schedule appointment for next week       DISCHARGE MEDICATIONS:  Discharge Medication List as of 6/30/2022  1:48 PM      START taking these medications    Details   pantoprazole (PROTONIX) 40 MG tablet Take 1 tablet by mouth every morning (before breakfast), Disp-30 tablet, R-3Print      ondansetron (ZOFRAN ODT) 4 MG disintegrating tablet Take 1 tablet by mouth every 8 hours as needed for Nausea or Vomiting, Disp-20 tablet, R-0Print             DISCONTINUED MEDICATIONS:  Discharge Medication List as of 6/30/2022  1:48 PM                 (Please note that portions ofthis note were completed with a voice recognition program.  Efforts were made to edit the dictations but occasionally words are mis-transcribed.)    TIBURCIO Mishra CNP (electronically signed)            TIBURCIO Mishra CNP  06/30/22 7366

## 2022-06-28 LAB
ALBUMIN SERPL-MCNC: 3.4 G/DL (ref 3.4–5)
ALP BLD-CCNC: 295 U/L (ref 40–129)
ALT SERPL-CCNC: 1638 U/L (ref 10–40)
AST SERPL-CCNC: 882 U/L (ref 15–37)
BILIRUB SERPL-MCNC: 9.8 MG/DL (ref 0–1)
BILIRUBIN DIRECT: 7.7 MG/DL (ref 0–0.3)
BILIRUBIN, INDIRECT: 2.1 MG/DL (ref 0–1)
TOTAL PROTEIN: 5.9 G/DL (ref 6.4–8.2)

## 2022-06-28 PROCEDURE — 99232 SBSQ HOSP IP/OBS MODERATE 35: CPT | Performed by: INTERNAL MEDICINE

## 2022-06-28 PROCEDURE — 36415 COLL VENOUS BLD VENIPUNCTURE: CPT

## 2022-06-28 PROCEDURE — 80076 HEPATIC FUNCTION PANEL: CPT

## 2022-06-28 PROCEDURE — 96375 TX/PRO/DX INJ NEW DRUG ADDON: CPT

## 2022-06-28 PROCEDURE — G0378 HOSPITAL OBSERVATION PER HR: HCPCS

## 2022-06-28 PROCEDURE — 6370000000 HC RX 637 (ALT 250 FOR IP): Performed by: PHYSICIAN ASSISTANT

## 2022-06-28 PROCEDURE — 6370000000 HC RX 637 (ALT 250 FOR IP): Performed by: INTERNAL MEDICINE

## 2022-06-28 PROCEDURE — 6360000002 HC RX W HCPCS: Performed by: INTERNAL MEDICINE

## 2022-06-28 PROCEDURE — 87517 HEPATITIS B DNA QUANT: CPT

## 2022-06-28 PROCEDURE — 2580000003 HC RX 258: Performed by: INTERNAL MEDICINE

## 2022-06-28 PROCEDURE — 96374 THER/PROPH/DIAG INJ IV PUSH: CPT

## 2022-06-28 PROCEDURE — 96376 TX/PRO/DX INJ SAME DRUG ADON: CPT

## 2022-06-28 RX ORDER — PANTOPRAZOLE SODIUM 40 MG/1
40 TABLET, DELAYED RELEASE ORAL
Status: DISCONTINUED | OUTPATIENT
Start: 2022-06-28 | End: 2022-06-30 | Stop reason: HOSPADM

## 2022-06-28 RX ORDER — ALPRAZOLAM 0.5 MG/1
0.5 TABLET ORAL NIGHTLY PRN
Status: DISCONTINUED | OUTPATIENT
Start: 2022-06-28 | End: 2022-06-30 | Stop reason: HOSPADM

## 2022-06-28 RX ADMIN — SODIUM CHLORIDE, POTASSIUM CHLORIDE, SODIUM LACTATE AND CALCIUM CHLORIDE: 600; 310; 30; 20 INJECTION, SOLUTION INTRAVENOUS at 11:53

## 2022-06-28 RX ADMIN — ALPRAZOLAM 0.5 MG: 0.5 TABLET ORAL at 22:43

## 2022-06-28 RX ADMIN — ONDANSETRON HYDROCHLORIDE 4 MG: 2 INJECTION, SOLUTION INTRAMUSCULAR; INTRAVENOUS at 22:32

## 2022-06-28 RX ADMIN — SODIUM CHLORIDE, POTASSIUM CHLORIDE, SODIUM LACTATE AND CALCIUM CHLORIDE: 600; 310; 30; 20 INJECTION, SOLUTION INTRAVENOUS at 22:31

## 2022-06-28 RX ADMIN — PANTOPRAZOLE SODIUM 40 MG: 40 TABLET, DELAYED RELEASE ORAL at 11:20

## 2022-06-28 RX ADMIN — Medication 10 ML: at 08:43

## 2022-06-28 ASSESSMENT — PAIN DESCRIPTION - ORIENTATION: ORIENTATION: RIGHT;MID

## 2022-06-28 ASSESSMENT — PAIN DESCRIPTION - DESCRIPTORS: DESCRIPTORS: DISCOMFORT

## 2022-06-28 ASSESSMENT — PAIN SCALES - GENERAL: PAINLEVEL_OUTOF10: 4

## 2022-06-28 ASSESSMENT — PAIN DESCRIPTION - LOCATION: LOCATION: ABDOMEN

## 2022-06-28 NOTE — CONSULTS
Gastroenterology Consult Note    Patient:   Tomas Leyva   :    1980   Facility:   [facility]  Referring/PCP: No primary care provider on file. Date:     2022  Consultant:   Doretha Brooks PA-C      Chief Complaint   Patient presents with    Jaundice     pt reports was sent from [facility] last night for direct admit. states went back to the house and did not have gas to get back to [place]. dx with hepatitis, jaundice         History of Present illness     43year old male with a history of COPD and headaches presented to the ED with jaundice. History obtained upon patient interview and chart review. He presented to [facility] on  with toothache and jaundice. He was diagnosed with acute HBV, and given Clindamycin and Oxycodone upon d/c. He admits to itching, nausea, vomiting, decreased appetite, heartburn, and epigastric abdominal pain, He passed a pale BM yesterday. He tried Prilosec and baking soda without relief. He denies rash, dysphagia, hematemesis, cramping, bloating, rectal bleeding, and melena. He takes Tylenol vs Ibuprofen 1-2x daily for headache relief. He used marijuana daily for pain relief, appetite stimulation, and to help him relax. He admits to family history of pancreatic cancer in his father. He has never had an EGD or colonoscopy. GI was consulted for evaluation of acute HBV. RUQ US showed gallbladder wall thickening with possible gallbladder polyps. CT abd/pelvis showed mild hepatosplenomegaly and acute acalculous cholecystitis. Past Medical History:   Diagnosis Date    COPD (chronic obstructive pulmonary disease) ([facility] [address]     Liver damage      History reviewed. No pertinent surgical history.     Social:   Social History     Tobacco Use    Smoking status: Current Every Day Smoker     Packs/day: 0.50    Smokeless tobacco: Never Used   Substance Use Topics    Alcohol use: Yes     Comment: social     Family:   Family History   Problem Relation Age of Onset  Cancer Father     Heart Disease Paternal Grandfather      No current facility-administered medications on file prior to encounter. No current outpatient medications on file prior to encounter. Infusions:    sodium chloride      lactated ringers 80 mL/hr at 06/27/22 1921     PRN Medications: sodium chloride flush, sodium chloride, ondansetron  Allergies: Allergies   Allergen Reactions    Other      ultram       ROS: Constitutional: negative for chills, fevers and sweats. Positive for fatigue and itching. Eyes: negative for cataracts, and redness. Positive for icterus.   Ears, nose, mouth, throat, and face: negative for epistaxis, hearing loss and sore throat  Respiratory: negative for cough, hemoptysis and sputum  Cardiovascular: negative for chest pain, dyspnea and lower extremity edema  Gastrointestinal: as per HPI  Genitourinary:negative for dysuria, frequency and hematuria  Neurological: negative for coordination problems, dizziness and gait problems  Behavioral/Psych: negative for anxiety, depression and mood swings    Physical Exam   BP (!) 97/54   Pulse 51   Temp 97.5 °F (36.4 °C) (Oral)   Resp 16   Ht 5' 7\" (1.702 m)   Wt 168 lb 1.6 oz (76.2 kg)   SpO2 98%   BMI 26.33 kg/m²       General appearance: alert, appears stated age, cooperative, fatigued, icteric, no distress and syndromic appearance - ill-appearing  Head: Normocephalic, without obvious abnormality, atraumatic  Eyes: positive findings: sclera icertic  Neck: no adenopathy and supple, symmetrical, trachea midline  Lungs: clear to auscultation bilaterally  Heart: regular rate and rhythm, S1, S2 normal, no murmur, click, rub or gallop  Abdomen: normal findings: bowel sounds normal, no masses palpable and symmetric and abnormal findings:  tenderness mild in the epigastrium  Extremities: extremities normal, atraumatic, no cyanosis or edema    Lab and Imaging Review   Labs:  CBC:   Recent Labs     06/26/22  1600 06/27/22  1627 oriented to person, place, and time  Eyes - sclera icteric  Neck - supple, no significant adenopathy  Chest - no tachypnea, retractions or cyanosis  Heart - normal rate and regular rhythm  Abdomen - soft, nontender, nondistended, no masses or organomegaly  Extremities - no edema  Skin - jaundice        Assessment:     43year old male with a history of COPD and headaches admitted with acute HBV and acute acalculous cholecystitis. Plan:   1. Continue supportive care  2. Monitor LFTs  3. Monitor and document output  4. Start Pantoprazole 40 mg qAM   5. Check HBV DNA PCR Quant  6. Continue low fat diet  7. Encourage nutrition  8. Ambulate/Up in chair TID  9. General surgery consulted  10. Will follow  11. Will repeat HBV labs in 2-4 weeks      Graylin Moritz, PA-C  11:45 AM 6/28/2022                      43year old male with COPD admitted with acute hepatitis B    Continue supportive care. Check hep B DNA count. Monitor LFTs. Encourage nutrition. Ambulate TID.  Will follow    Hector Appiah MD          [phone]  Lilo Kate

## 2022-06-28 NOTE — H&P
[address]                 [address] [address]                              HISTORY AND PHYSICAL    PATIENT NAME: Sajan Núñez                   :        1980  MED REC NO:   [id number]                          ROOM:       0236  ACCOUNT NO:   [de-identified]                           ADMIT DATE: 2022  PROVIDER:     Amita Llanos MD    CHIEF COMPLAINT:  Abdominal pain with nausea and yellow skin. HISTORY OF PRESENT ILLNESS:  The patient is a 66-year-old  male  presenting to hospital with chief complaints of 1-1/2 to 2-week history  of subacute onset of gradually progressively increasing yellowing of his  skin associated with right upper quadrant abdominal pain with some  nausea without any obvious vomiting, fevers or chills. No hemoptysis or  hematemesis. No melena or hematochezia. PAST MEDICAL/PAST SURGICAL HISTORY:  None. ALLERGIC HISTORY:  No known drug allergies. FAMILY HISTORY:  Reviewed by me and is currently noncontributory. SOCIAL HISTORY:  The patient denies any illicit substance use. MEDICATIONS:  The patient's home medication list reviewed and documented  in the EMR. The patient is not on any home medications. REVIEW OF SYSTEMS:  Significant for the nausea, vomiting and abdominal  pain and per the history of present illness. All other systems have  been reviewed and are negative except for the history of present  illness. Physical exam: was 90, 04, respiratory rate is 16, pulse 6 ml, blood  pressure 117/71, saturating percent CNS:  PHYSICAL EXAMINATION:  VITAL SIGNS:  Temperature 98.4, respiratory rate is 16, pulse 66, blood  pressure 117/71, saturating 100%. CNS:  Alert, awake and oriented. PSYCHIATRIC:  Cooperative. Answering questions appropriately. HEENT:  Eyes, pupils are bilaterally equal.  Significant scleral icterus  noted. ENT, No oral mucosal lesions.   RESPIRATORY SYSTEM: No obvious rales, rubs or rhonchi. CVS:  S1, S2 are heard. No murmurs or rubs. ABDOMEN:  The patient has got right upper quadrant tenderness to  palpation in the area of the liver. MUSCULOSKELETAL:  No acute obvious deformities. SKIN:  Patient has got significant cutaneous icterus. DIAGNOSTIC DATA:  COVID test negative. CT abdomen and pelvis shows possibility of acute acalculous  cholecystitis. Ultrasound examination of the gallbladder shows no evidence of  pericholecystic fluid. Sonographic Ruth's sign negative. INR 1.06. BUN 10, creatinine 0.6. Alk phos 317. ALT 1770, AST is 78. CBC showed  a white count of 7.7, hemoglobin 13.9, hematocrit 41.3, platelets of  666. CONSULTATIONS:  Requested by Gastroenterology. ASSESSMENT:  1. Acute hepatitis B with transaminitis. PLAN OF CARE:  The patient admitted to Internal Medicine Service to  observation. GI consult requested. Serial liver function estimation  will be carried out along with estimation of the INR and serum albumin  levels as necessary. The patient will be watched for any signs of  development of fulminant hepatic failure. CODE STATUS:  FULL. EXPECTED LENGTH OF STAY:  Less than two midnights based on the plan of  care above. Risk high due to the patient's presentation with acute hepatitis B with  significant transaminase elevation. DISPOSITION:  Observation.         Kelly Helton MD    D: 06/28/2022 5:23:55       T: 06/28/2022 5:26:22     [email or web address]  Job#: [id number]     Doc#: [id number]    CC:

## 2022-06-28 NOTE — FLOWSHEET NOTE
06/27/22 2245   Vital Signs   Temp 97 °F (36.1 °C)   Temp Source Oral   Heart Rate 53   Heart Rate Source Monitor   Resp 16   /71   BP Location Left upper arm   BP Method Automatic   MAP (Calculated) 88   Level of Consciousness Alert (0)   MEWS Score 1   Oxygen Therapy   SpO2 100 %   O2 Device None (Room air)   Height and Weight   Height 5' 7\" (1.702 m)   Weight 168 lb 1.6 oz (76.2 kg)   Weight Method Standing scale   BSA (Calculated - sq m) 1.9 sq meters   BMI (Calculated) 26.4   admission assessment completed. VSS. Pt a/o x4, pt able to walk to bathroom as needed, advised to call out if needs help walking with iv pole. Boxed meal given to pt. No further needs at this time.  Call light within reach, bedside table next to pt

## 2022-06-28 NOTE — CARE COORDINATION
Case Management Assessment  Initial Evaluation      Patient Name: Lidia Connolly  YOB: 1980  Diagnosis: Acute type B viral hepatitis [B16.9]  Date / Time: 6/27/2022  5:17 PM    Admission status/Date: OBSERVATION  Chart Reviewed: Yes      Patient Interviewed: Yes   Family Interviewed:  No      Hospitalization in the last 30 days:  No      Health Care Decision Maker :   Naomy Chavez, mother: [phone]  Jessica Cody, father: [phone]    (CM - must 1st enter selection under Navigator - emergency contact- Health Care Decision Maker Relationship and pick relationship)   Who do you trust or have selected to make healthcare decisions for you      Met with: pt at bedside  Interview conducted  (bedside/phone):    Current PCP: none listed    Financial  Commercial  Precert required for SNF : Y, N          3 night stay required - Y, N    ADLS  Support Systems/Care Needs:    Transportation: family    Meal Preparation: self    Housing  Living Arrangements: home with mother.    Steps: n/a  Intent for return to present living arrangements: Yes  Identified Issues: -    [address] : No Agency:(Services)     Passport/Waiver : No  :                      Phone Number:    Passport/Waiver Services: -          Durable Medical Equiptment   DME Provider: n/a  Equipment: n/a  Walker___Cane___RTS___ BSC___Shower Chair___Hospital Bed___W/C____Other________  02 at ____Liter(s)---wears(frequency)_______ HHN ___ CPAP___ BiPap___   N/A____      Home O2 Use :  No    If No for home O2---if presently on O2 during hospitalization:  No  if yes CM to follow for potential DC O2 need  Informed of need for care provider to bring portable home O2 tank on day of discharge for nursing to connect prior to leaving:   Not Indicated  Verbalized agreement/Understanding:   Not Indicated    Community Service Affiliation  Dialysis:  No    · Agency:  · Location:  · Dialysis Schedule:  · Phone:   · Fax: Other Community Services: (ex:PT/OT,Mental Health,Wound Clinic, Cardio/Pul [address])    DISCHARGE PLAN: Explained Case Management role/services. CM reviewed chart and met with pt. Pt reports to live at home with his mother and states that he is IPTA. Pt plans to return home at discharge and denies needs at this time. CM will follow peripherally for potential discharge needs.

## 2022-06-28 NOTE — PLAN OF CARE
Problem: Discharge Planning  Goal: Discharge to home or other facility with appropriate resources  6/28/2022 0936 by Tiffanie Garrido RN  Outcome: Progressing  6/28/2022 0138 by Shaq Huizar RN  Outcome: Progressing  Flowsheets (Taken 6/28/2022 0138)  Discharge to home or other facility with appropriate resources: Identify barriers to discharge with patient and caregiver     Problem: Pain  Goal: Verbalizes/displays adequate comfort level or baseline comfort level  6/28/2022 0936 by Tiffanie Garrido RN  Outcome: Progressing  6/28/2022 0138 by Shaq Huizar RN  Outcome: Progressing  Flowsheets (Taken 6/28/2022 0138)  Verbalizes/displays adequate comfort level or baseline comfort level: Encourage patient to monitor pain and request assistance

## 2022-06-28 NOTE — PLAN OF CARE
Problem: Discharge Planning  Goal: Discharge to home or other facility with appropriate resources  Outcome: Progressing  Flowsheets (Taken 6/28/2022 0138)  Discharge to home or other facility with appropriate resources: Identify barriers to discharge with patient and caregiver     Problem: Pain  Goal: Verbalizes/displays adequate comfort level or baseline comfort level  Outcome: Progressing  Flowsheets (Taken 6/28/2022 0138)  Verbalizes/displays adequate comfort level or baseline comfort level: Encourage patient to monitor pain and request assistance

## 2022-06-28 NOTE — ACP (ADVANCE CARE PLANNING)
Advance Care Planning     General Advance Care Planning (ACP) Conversation    Date of Conversation: 6/27/2022  Conducted with: Patient with Decision Making Capacity    Healthcare Decision Maker:  No healthcare decision makers have been documented. Click here to complete [address] including selection of the Healthcare Decision Maker Relationship (ie \"Primary\")   Today we documented Decision Maker(s) consistent with Legal Next of Kin hierarchy.     Content/Action Overview:  DECLINED ACP Conversation - will revisit periodically  Reviewed DNR/DNI and patient elects Full Code (Attempt Resuscitation)        Length of Voluntary ACP Conversation in minutes: less than 16 minutes    Paramjit Pepe RN

## 2022-06-28 NOTE — PROGRESS NOTES
4 Eyes Skin Assessment     The patient is being assess for   Admission    I agree that 2 RN's have performed a thorough Head to Toe Skin Assessment on the patient. ALL assessment sites listed below have been assessed. Areas assessed for pressure by both nurses:   [x]   Head, Face, and Ears   [x]   Shoulders, Back, and Chest, Abdomen  [x]   Arms, Elbows, and Hands   [x]   Coccyx, Sacrum, and Ischium  [x]   Legs, Feet, and Heels                     **SHARE this note so that the co-signing nurse is able to place an eSignature**    Co-signer eSignature: Electronically signed by Peggy Reynolds RN on 6/28/22 at 12:50 AM EDT    Does the Patient have Skin Breakdown related to pressure? No            Oswald Prevention initiated:  Yes   Wound Care Orders initiated:  No          Primary Nurse eSignature: Electronically signed by Katelyn Hightower RN on 6/28/22 at 12:49 AM EDT    Patient is able to demonstrate the ability to move from a reclining position to an upright position within the recliner.

## 2022-06-28 NOTE — PROGRESS NOTES
IM Progress Note    Admit Date:  6/27/2022  0    Interval history:  Acute hepatitis B     Subjective:  Mr. Ronald Jordan seen up in bed, no more emesis since admission     He thinks he had unprotected sexual activity about a month ago  No needle use or recent tattoos  Tolerating diet ok      Objective:   BP (!) 97/54   Pulse 51   Temp 97.5 °F (36.4 °C) (Oral)   Resp 16   Ht 5' 7\" (1.702 m)   Wt 168 lb 1.6 oz (76.2 kg)   SpO2 98%   BMI 26.33 kg/m²     Intake/Output Summary (Last 24 hours) at 6/28/2022 1304  Last data filed at 6/28/2022 1237  Gross per 24 hour   Intake 490 ml   Output 1200 ml   Net -710 ml       Physical Exam:        General: middle aged male,   Awake, alert and oriented. Appears to be not in any distress  Mucous Membranes:  Pink , anicteric  Neck: No JVD, no carotid bruit, no thyromegaly  Chest:  Clear to auscultation bilaterally, no added sounds  Cardiovascular:  RRR S1S2 heard, no murmurs or gallops  Abdomen:  Soft, undistended, minimal epigastric tenderness +  No guarding  , no organomegaly, BS present  Extremities: some small tattoos on both UE  No edema or cyanosis.  Distal pulses well felt  Neurological : grossly normal      Medications:   Scheduled Medications:    pantoprazole  40 mg Oral QAM AC    sodium chloride flush  5-40 mL IntraVENous 2 times per day    enoxaparin  40 mg SubCUTAneous Daily     I   sodium chloride      lactated ringers 80 mL/hr at 06/28/22 1153     sodium chloride flush, sodium chloride, ondansetron    Lab Data:  Recent Labs     06/26/22  1600 06/27/22  1627   WBC 8.2 7.7   HGB 14.4 13.9   HCT 42.0 41.3   MCV 90.5 91.0    264     Recent Labs     06/26/22  1600 06/27/22  1627    139   K 3.8 3.8    106   CO2 23 23   BUN 9 10   CREATININE <0.5* 0.6*     Recent Labs     06/26/22  1600   TROPONINI <0.01       Coagulation:   Lab Results   Component Value Date    INR 1.06 06/27/2022    APTT 39.1 06/27/2022     Cardiac markers:   Lab Results   Component Value Date    TROPONINI <0.01 06/26/2022         Lab Results   Component Value Date    ALT 1,638 (H) 06/28/2022     (H) 06/28/2022    ALKPHOS 295 (H) 06/28/2022    BILITOT 9.8 (H) 06/28/2022       Lab Results   Component Value Date    INR 1.06 06/27/2022    INR 1.08 06/26/2022    PROTIME 13.6 06/27/2022    PROTIME 13.9 06/26/2022       Radiology    Chest xray - no acute process    Ct abd    CT findings in correlation with the prior gallbladder ultrasound findings are   highly suspicious for acute acalculous cholecystitis.  Clinical correlation   and follow-up nuclear medicine hepatobiliary imaging study would be helpful    Us abd    Gallbladder wall thickening with possible gallbladder wall polyps, no   definite gallstones.  No pericholecystic fluid.  Negative sonographic   Ruth's sign. Common bile duct not visualized     Cultures:   covid neg      Assessment & Plan:      1. Acute hepatitis B  2. Ttransaminitis /hyperbilirubinemia  3. abd pain     - supportive care with IVF, nausea control.  Clear liquid diet  - doubt acute cholecystitis  - can stop abx  - f/w LFT , GI consult  - consider tx for hep B as outpt    dvt prophylaxis     Laz Bains MD, 6/28/2022 1:04 PM

## 2022-06-28 NOTE — ED NOTES
2056 Perfect Serve Sent To Dr. Cherri Sharma for consult      Allyson Gilbert  06/27/22 2056 2117 Consult completed with call back from Dr. Cherri Sharma spoke With [facility]  06/27/22 2124

## 2022-06-28 NOTE — ED NOTES
2058 Perfect serve sent to Dr. Edgar Valadez  06/27/22 2100 2144 Consult completed with callback from Dr. Dai Player spoke with Gloria Johnson  06/27/22 214

## 2022-06-28 NOTE — FLOWSHEET NOTE
06/28/22 0830   Vital Signs   Temp 97.5 °F (36.4 °C)   Temp Source Oral   Heart Rate 51   Heart Rate Source Monitor   Resp 16   BP (!) 97/54   BP Location Right upper arm   BP Method Automatic   MAP (Calculated) 68.33   Patient Position Semi fowlers   Level of Consciousness Alert (0)   MEWS Score 2   Pain Assessment   Pain Assessment 0-10   Pain Level 4   Pain Location Abdomen   Pain Orientation Right;Mid   Pain Descriptors Discomfort   Non-Pharmaceutical Pain Intervention(s) Rest   Opioid-Induced Sedation   POSS Score 1   RASS   Palomares Agitation Sedation Scale (RASS) 0   Oxygen Therapy   SpO2 98 %   O2 Device None (Room air)     Alert and oriented x4. Skin w/d. Rep e/e unlabored. Nursing asmt completed. C/o right mid abd discomfort. Call light in easy reach. No ss distress noted.

## 2022-06-28 NOTE — ED PROVIDER NOTES
I independently examined and evaluated [facility]. All diagnostic, treatment, and disposition decisions were made by myself in conjunction with the [facility]. . For all further details of the patient's emergency department visit, please see their documentation. 63-year-old male presents with abdominal pain and jaundice. He states the jaundice started a week ago. He has developed right upper quadrant abdominal pain. He was seen at [facility] yesterday and they plan to admit him to the hospital.  The patient was going to transport himself via private vehicle but he never came to the hospital for admission. He states he went home but ultimately decided to return today for admission. He denies history of excessive alcohol use but does have a history of IV drug abuse. He denies fevers, chest pain or shortness of breath. Vitals:    06/30/22 1416   BP: 111/67   Pulse: 61   Resp: 16   Temp: 97 °F (36.1 °C)   SpO2: 98%     On exam he has icteric sclera jaundiced skin. He has diffuse upper abdominal tenderness. Heart is regular rate and rhythm, no respiratory distress.       Results for orders placed or performed during the hospital encounter of 06/27/22   COVID-19 & Influenza Combo    Specimen: Nasopharyngeal Swab   Result Value Ref Range    SARS-CoV-2 RNA, RT PCR NOT DETECTED NOT DETECTED    INFLUENZA A NOT DETECTED NOT DETECTED    INFLUENZA B NOT DETECTED NOT DETECTED   CBC with Auto Differential   Result Value Ref Range    WBC 7.7 4.0 - 11.0 K/uL    RBC 4.54 4.20 - 5.90 M/uL    Hemoglobin 13.9 13.5 - 17.5 g/dL    Hematocrit 41.3 40.5 - 52.5 %    MCV 91.0 80.0 - 100.0 fL    MCH 30.6 26.0 - 34.0 pg    MCHC 33.7 31.0 - 36.0 g/dL    RDW 17.1 (H) 12.4 - 15.4 %    Platelets 517 184 - 801 K/uL    MPV 8.3 5.0 - 10.5 fL    Neutrophils % 66.8 %    Lymphocytes % 16.9 %    Monocytes % 11.2 %    Eosinophils % 4.5 %    Basophils % 0.6 %    Neutrophils Absolute 5.1 1.7 - 7.7 K/uL    Lymphocytes Absolute 1.3 1.0 - 5.1 K/uL    Monocytes Absolute 0.9 0.0 - 1.3 K/uL    Eosinophils Absolute 0.3 0.0 - 0.6 K/uL    Basophils Absolute 0.0 0.0 - 0.2 K/uL   Comprehensive Metabolic Panel w/ Reflex to MG   Result Value Ref Range    Sodium 139 136 - 145 mmol/L    Potassium reflex Magnesium 3.8 3.5 - 5.1 mmol/L    Chloride 106 99 - 110 mmol/L    CO2 23 21 - 32 mmol/L    Anion Gap 10 3 - 16    Glucose 82 70 - 99 mg/dL    BUN 10 7 - 20 mg/dL    CREATININE 0.6 (L) 0.9 - 1.3 mg/dL    GFR Non-African American >60 >60    GFR African American >60 >60    Calcium 8.6 8.3 - 10.6 mg/dL    Total Protein 6.5 6.4 - 8.2 g/dL    Albumin 3.8 3.4 - 5.0 g/dL    Albumin/Globulin Ratio 1.4 1.1 - 2.2    Total Bilirubin 9.7 (H) 0.0 - 1.0 mg/dL    Alkaline Phosphatase 317 (H) 40 - 129 U/L    ALT 1,770 (H) 10 - 40 U/L     (H) 15 - 37 U/L   Lipase   Result Value Ref Range    Lipase 28.0 13.0 - 60.0 U/L   Protime-INR   Result Value Ref Range    Protime 13.6 11.7 - 14.5 sec    INR 1.06 0.87 - 1.14   APTT   Result Value Ref Range    aPTT 39.1 (H) 23.0 - 34.3 sec   Lactic Acid   Result Value Ref Range    Lactic Acid 0.9 0.4 - 2.0 mmol/L   Ethanol   Result Value Ref Range    Ethanol Lvl None Detected mg/dL   Ammonia   Result Value Ref Range    Ammonia 21 16 - 60 umol/L   Hepatic function panel   Result Value Ref Range    Total Protein 5.9 (L) 6.4 - 8.2 g/dL    Albumin 3.4 3.4 - 5.0 g/dL    Alkaline Phosphatase 295 (H) 40 - 129 U/L    ALT 1,638 (H) 10 - 40 U/L     (H) 15 - 37 U/L    Total Bilirubin 9.8 (H) 0.0 - 1.0 mg/dL    Bilirubin, Direct 7.7 (H) 0.0 - 0.3 mg/dL    Bilirubin, Indirect 2.1 (H) 0.0 - 1.0 mg/dL   Hepatitis B, Quantitative, Molecular   Result Value Ref Range    HBV Quantitative, IU/ML 5,969,622 IU/mL    HBV Quantitative, log IU/ML 6.94 log IU/mL    Interpretation Detected (A) Not Detected   Comprehensive Metabolic Panel w/ Reflex to MG   Result Value Ref Range    Sodium 138 136 - 145 mmol/L    Potassium reflex Magnesium 3.6 3.5 - 5.1 mmol/L    Chloride 105 99 - 110 mmol/L    CO2 23 21 - 32 mmol/L    Anion Gap 10 3 - 16    Glucose 108 (H) 70 - 99 mg/dL    BUN 8 7 - 20 mg/dL    CREATININE <0.5 (L) 0.9 - 1.3 mg/dL    GFR Non-African American >60 >60    GFR African American >60 >60    Calcium 9.0 8.3 - 10.6 mg/dL    Total Protein 6.0 (L) 6.4 - 8.2 g/dL    Albumin 3.3 (L) 3.4 - 5.0 g/dL    Albumin/Globulin Ratio 1.2 1.1 - 2.2    Total Bilirubin 10.4 (H) 0.0 - 1.0 mg/dL    Alkaline Phosphatase 288 (H) 40 - 129 U/L    ALT 1,598 (H) 10 - 40 U/L    AST 1,032 (H) 15 - 37 U/L   CBC with Auto Differential   Result Value Ref Range    WBC 6.7 4.0 - 11.0 K/uL    RBC 4.29 4. 20 - 5.90 M/uL    Hemoglobin 13.5 13.5 - 17.5 g/dL    Hematocrit 38.4 (L) 40.5 - 52.5 %    MCV 89.5 80.0 - 100.0 fL    MCH 31.4 26.0 - 34.0 pg    MCHC 35.0 31.0 - 36.0 g/dL    RDW 16.9 (H) 12.4 - 15.4 %    Platelets 198 769 - 275 K/uL    MPV 8.5 5.0 - 10.5 fL    Neutrophils % 63.4 %    Lymphocytes % 20.5 %    Monocytes % 10.5 %    Eosinophils % 4.5 %    Basophils % 1.1 %    Neutrophils Absolute 4.3 1.7 - 7.7 K/uL    Lymphocytes Absolute 1.4 1.0 - 5.1 K/uL    Monocytes Absolute 0.7 0.0 - 1.3 K/uL    Eosinophils Absolute 0.3 0.0 - 0.6 K/uL    Basophils Absolute 0.1 0.0 - 0.2 K/uL   Comprehensive Metabolic Panel w/ Reflex to MG   Result Value Ref Range    Sodium 136 136 - 145 mmol/L    Potassium reflex Magnesium 3.9 3.5 - 5.1 mmol/L    Chloride 102 99 - 110 mmol/L    CO2 23 21 - 32 mmol/L    Anion Gap 11 3 - 16    Glucose 92 70 - 99 mg/dL    BUN 10 7 - 20 mg/dL    CREATININE <0.5 (L) 0.9 - 1.3 mg/dL    GFR Non-African American >60 >60    GFR African American >60 >60    Calcium 9.2 8.3 - 10.6 mg/dL    Total Protein 6.3 (L) 6.4 - 8.2 g/dL    Albumin 3.6 3.4 - 5.0 g/dL    Albumin/Globulin Ratio 1.3 1.1 - 2.2    Total Bilirubin 11.8 (H) 0.0 - 1.0 mg/dL    Alkaline Phosphatase 290 (H) 40 - 129 U/L    ALT 1,757 (H) 10 - 40 U/L    AST 1,120 (H) 15 - 37 U/L       CT ABDOMEN PELVIS W IV CONTRAST Additional Contrast? None   Final Result   CT findings in correlation with the prior gallbladder ultrasound findings are   highly suspicious for acute acalculous cholecystitis. Clinical correlation   and follow-up nuclear medicine hepatobiliary imaging study would be helpful. XR CHEST PORTABLE   Final Result   No acute process. US GALLBLADDER RUQ   Final Result   Gallbladder wall thickening with possible gallbladder wall polyps, no   definite gallstones. No pericholecystic fluid. Negative sonographic   Ruth's sign. Common bile duct not visualized                 I personally saw and performed a substantive portion of the visit including all aspects of the medical decision making. MDM:        Here his LFTs and bilirubin are elevated. CT scan is concerning for possible cholecystitis. Right upper quadrant ultrasound however shows gallbladder wall but no other findings of cholecystitis. We will perform hepatitis testing. General surgery was consulted.   We will admit to the hospitalist.       Elena Echevarria MD  07/01/22 2616

## 2022-06-29 LAB
A/G RATIO: 1.2 (ref 1.1–2.2)
ALBUMIN SERPL-MCNC: 3.3 G/DL (ref 3.4–5)
ALP BLD-CCNC: 288 U/L (ref 40–129)
ALT SERPL-CCNC: 1598 U/L (ref 10–40)
ANION GAP SERPL CALCULATED.3IONS-SCNC: 10 MMOL/L (ref 3–16)
AST SERPL-CCNC: 1032 U/L (ref 15–37)
BASOPHILS ABSOLUTE: 0.1 K/UL (ref 0–0.2)
BASOPHILS RELATIVE PERCENT: 1.1 %
BILIRUB SERPL-MCNC: 10.4 MG/DL (ref 0–1)
BUN BLDV-MCNC: 8 MG/DL (ref 7–20)
CALCIUM SERPL-MCNC: 9 MG/DL (ref 8.3–10.6)
CHLORIDE BLD-SCNC: 105 MMOL/L (ref 99–110)
CO2: 23 MMOL/L (ref 21–32)
CREAT SERPL-MCNC: <0.5 MG/DL (ref 0.9–1.3)
EOSINOPHILS ABSOLUTE: 0.3 K/UL (ref 0–0.6)
EOSINOPHILS RELATIVE PERCENT: 4.5 %
GFR AFRICAN AMERICAN: >60
GFR NON-AFRICAN AMERICAN: >60
GLUCOSE BLD-MCNC: 108 MG/DL (ref 70–99)
HCT VFR BLD CALC: 38.4 % (ref 40.5–52.5)
HEMOGLOBIN: 13.5 G/DL (ref 13.5–17.5)
HEPATITIS B SURFACE ANTIGEN CONFIRMATION: POSITIVE
LYMPHOCYTES ABSOLUTE: 1.4 K/UL (ref 1–5.1)
LYMPHOCYTES RELATIVE PERCENT: 20.5 %
MCH RBC QN AUTO: 31.4 PG (ref 26–34)
MCHC RBC AUTO-ENTMCNC: 35 G/DL (ref 31–36)
MCV RBC AUTO: 89.5 FL (ref 80–100)
MONOCYTES ABSOLUTE: 0.7 K/UL (ref 0–1.3)
MONOCYTES RELATIVE PERCENT: 10.5 %
NEUTROPHILS ABSOLUTE: 4.3 K/UL (ref 1.7–7.7)
NEUTROPHILS RELATIVE PERCENT: 63.4 %
PDW BLD-RTO: 16.9 % (ref 12.4–15.4)
PLATELET # BLD: 228 K/UL (ref 135–450)
PMV BLD AUTO: 8.5 FL (ref 5–10.5)
POTASSIUM REFLEX MAGNESIUM: 3.6 MMOL/L (ref 3.5–5.1)
RBC # BLD: 4.29 M/UL (ref 4.2–5.9)
SODIUM BLD-SCNC: 138 MMOL/L (ref 136–145)
TOTAL PROTEIN: 6 G/DL (ref 6.4–8.2)
WBC # BLD: 6.7 K/UL (ref 4–11)

## 2022-06-29 PROCEDURE — G0378 HOSPITAL OBSERVATION PER HR: HCPCS

## 2022-06-29 PROCEDURE — 2580000003 HC RX 258: Performed by: INTERNAL MEDICINE

## 2022-06-29 PROCEDURE — 36415 COLL VENOUS BLD VENIPUNCTURE: CPT

## 2022-06-29 PROCEDURE — 6370000000 HC RX 637 (ALT 250 FOR IP): Performed by: INTERNAL MEDICINE

## 2022-06-29 PROCEDURE — 85025 COMPLETE CBC W/AUTO DIFF WBC: CPT

## 2022-06-29 PROCEDURE — 99232 SBSQ HOSP IP/OBS MODERATE 35: CPT | Performed by: INTERNAL MEDICINE

## 2022-06-29 PROCEDURE — 80053 COMPREHEN METABOLIC PANEL: CPT

## 2022-06-29 PROCEDURE — 6370000000 HC RX 637 (ALT 250 FOR IP): Performed by: PHYSICIAN ASSISTANT

## 2022-06-29 RX ADMIN — SODIUM CHLORIDE, POTASSIUM CHLORIDE, SODIUM LACTATE AND CALCIUM CHLORIDE: 600; 310; 30; 20 INJECTION, SOLUTION INTRAVENOUS at 22:34

## 2022-06-29 RX ADMIN — PANTOPRAZOLE SODIUM 40 MG: 40 TABLET, DELAYED RELEASE ORAL at 06:56

## 2022-06-29 RX ADMIN — SODIUM CHLORIDE, POTASSIUM CHLORIDE, SODIUM LACTATE AND CALCIUM CHLORIDE: 600; 310; 30; 20 INJECTION, SOLUTION INTRAVENOUS at 11:45

## 2022-06-29 RX ADMIN — Medication 10 ML: at 20:22

## 2022-06-29 RX ADMIN — ALPRAZOLAM 0.5 MG: 0.5 TABLET ORAL at 22:30

## 2022-06-29 RX ADMIN — Medication 10 ML: at 08:52

## 2022-06-29 ASSESSMENT — PAIN DESCRIPTION - LOCATION: LOCATION: ABDOMEN

## 2022-06-29 ASSESSMENT — PAIN SCALES - GENERAL: PAINLEVEL_OUTOF10: 2

## 2022-06-29 ASSESSMENT — PAIN DESCRIPTION - ORIENTATION: ORIENTATION: RIGHT;MID

## 2022-06-29 NOTE — PROGRESS NOTES
Component Value Date    INR 1.06 06/27/2022    APTT 39.1 06/27/2022     Cardiac markers:   Lab Results   Component Value Date    TROPONINI <0.01 06/26/2022         Lab Results   Component Value Date    ALT 1,598 (H) 06/29/2022    AST 1,032 (H) 06/29/2022    ALKPHOS 288 (H) 06/29/2022    BILITOT 10.4 (H) 06/29/2022       Lab Results   Component Value Date    INR 1.06 06/27/2022    INR 1.08 06/26/2022    PROTIME 13.6 06/27/2022    PROTIME 13.9 06/26/2022       Radiology    Chest xray - no acute process    Ct abd    CT findings in correlation with the prior gallbladder ultrasound findings are   highly suspicious for acute acalculous cholecystitis.  Clinical correlation   and follow-up nuclear medicine hepatobiliary imaging study would be helpful    Us abd    Gallbladder wall thickening with possible gallbladder wall polyps, no   definite gallstones.  No pericholecystic fluid.  Negative sonographic   Ruth's sign. Common bile duct not visualized     Cultures:   covid neg      Assessment & Plan:      1. Acute hepatitis B  2. Ttransaminitis /hyperbilirubinemia  3. abd pain     - supportive care with IVF, nausea control.  Clear liquid diet  - doubt acute cholecystitis  - can stop abx  - f/w LFT , GI consulted  - consider tx for hep B as outpt    dvt prophylaxis     Sean Forbes MD, 6/29/2022 11:36 AM

## 2022-06-29 NOTE — PROGRESS NOTES
PROGRESS NOTE  S:42 yrs Patient  admitted on 6/27/2022 with Acute type B viral hepatitis [B16.9] . Today he complains of fatigue and jaundice. He is tolerating diet. He passed 3x BMs yesterday, and 1x BM thus far today. Exam:   Vitals:    06/29/22 1319   BP: 108/62   Pulse: 58   Resp: 16   Temp: 97.2 °F (36.2 °C)   SpO2: 99%      General appearance: alert, appears stated age, cooperative, fatigued, icteric and no distress  HEENT: Neck supple with midline trachea, scleral icterus   Neck: no adenopathy and supple, symmetrical, trachea midline  Lungs: clear to auscultation bilaterally  Heart: regular rate and rhythm, S1, S2 normal, no murmur, click, rub or gallop  Abdomen: soft, non-tender; bowel sounds normal; no masses,  no organomegaly  Extremities: extremities normal, atraumatic, no cyanosis or edema     Medications: Reviewed    Labs:  CBC:   Recent Labs     06/26/22  1600 06/27/22  1627 06/29/22  0601   WBC 8.2 7.7 6.7   HGB 14.4 13.9 13.5   HCT 42.0 41.3 38.4*   MCV 90.5 91.0 89.5    264 228     BMP:   Recent Labs     06/26/22  1600 06/27/22  1627 06/29/22  0601    139 138   K 3.8 3.8 3.6    106 105   CO2 23 23 23   BUN 9 10 8   CREATININE <0.5* 0.6* <0.5*     LIVER PROFILE:   Recent Labs     06/26/22 1600 06/26/22  1600 06/27/22  1627 06/28/22  0535 06/29/22  0601   *   < > 878* 882* 1,032*   ALT 1,754*   < > 1,770* 1,638* 1,598*   LIPASE 33.0  --  28.0  --   --    PROT 6.6   < > 6.5 5.9* 6.0*   BILIDIR 7.8*  --   --  7.7*  --    BILITOT 9.8*   < > 9.7* 9.8* 10.4*   ALKPHOS 339*   < > 317* 295* 288*    < > = values in this interval not displayed. PT/INR:   Recent Labs     06/26/22  1600 06/27/22  1627   INR 1.08 1.06     Attending Supervising [de-identified] Attestation Statement  The patient is a 43 y.o. male. I have performed a history and physical examination of the patient.  I discussed the case with my physician assistant Wilian Cabrera Jeff MCELROY    I reviewed the patient's Past Medical History, Past Surgical History, Medications, and Allergies. Physical Exam:  Vitals:    06/28/22 2050 06/29/22 0106 06/29/22 0845 06/29/22 1319   BP: 102/60 108/60 106/63 108/62   Pulse: 60 52 54 58   Resp: 16 16 16 16   Temp: 98.9 °F (37.2 °C) 97.8 °F (36.6 °C) 98.3 °F (36.8 °C) 97.2 °F (36.2 °C)   TempSrc: Oral Oral Oral Oral   SpO2: 98% 97% 96% 99%   Weight:       Height:           Physical Examination: General appearance - improved  Mental status - alert, oriented to person, place, and time  Eyes - sclera anicteric  Neck - supple, no significant adenopathy  Chest - no tachypnea, retractions or cyanosis  Heart - normal rate and regular rhythm  Abdomen - soft, nontender, nondistended, no masses or organomegaly  Extremities - no pedal edema noted          Impression: 43year old male with a history of COPD and headaches admitted with acute HBV. CT abd/pelvis showed possible acute acalculous cholecystitis. Recommendation:  1. Continue supportive care  2. Monitor LFTs  3. Monitor and document output  4. Continue Pantoprazole 40 mg qAM   5. HBV DNA PCR Quant - in process   6. Continue low fat diet  7. Encourage nutrition  8. Ambulate/Up in chair TID  9. Will follow  10. Consider general surgery consult as OP       Chi Walker PA-C  2:44 PM 6/29/2022                      43year old male with COPD admitted with acute hepatitis B    Continue supportive care. Await hep B DNA Quant. Monitor LFTs. Encourage nutrition. Ambulate TID. Will follow.        Rajiv Metzger MD          [phone]  [phone]

## 2022-06-29 NOTE — PROGRESS NOTES
Snack given to patient per request. No voiced concerns. No s/s distress noted. resp e/e unlabored. Call light in easy reach.

## 2022-06-29 NOTE — PLAN OF CARE
Problem: Discharge Planning  Goal: Discharge to home or other facility with appropriate resources  6/28/2022 2227 by Evelio Mata RN  Outcome: Progressing  6/28/2022 0936 by Tresa Colbert RN  Outcome: Progressing     Problem: Pain  Goal: Verbalizes/displays adequate comfort level or baseline comfort level  6/28/2022 2227 by Evelio Mata RN  Outcome: Progressing  6/28/2022 0936 by Tresa Colbert RN  Outcome: Progressing     Problem: Safety - Adult  Goal: Free from fall injury  Outcome: Progressing

## 2022-06-29 NOTE — PLAN OF CARE
Problem: Discharge Planning  Goal: Discharge to home or other facility with appropriate resources  6/29/2022 1021 by Gerald Rudd RN  Outcome: Progressing  6/28/2022 2227 by Tod Hair RN  Outcome: Progressing     Problem: Pain  Goal: Verbalizes/displays adequate comfort level or baseline comfort level  6/29/2022 1021 by Gerald Rudd RN  Outcome: Progressing  Flowsheets (Taken 6/29/2022 0845)  Verbalizes/displays adequate comfort level or baseline comfort level: Encourage patient to monitor pain and request assistance  6/28/2022 2227 by Tod Hair RN  Outcome: Progressing     Problem: Safety - Adult  Goal: Free from fall injury  6/29/2022 1021 by Gerald Rudd RN  Outcome: Progressing  6/28/2022 2227 by Tod Hair RN  Outcome: Progressing

## 2022-06-29 NOTE — PROGRESS NOTES
Shift assessment complete. See doc flow. No nightly medications scheduled see MAR. IVF infusing. Patient c/o trouble resting/ sleeping. Perfect served MD. PRN Xanax 0.5 mg given. Patient ambulates independently. Call light and bedside table within easy reach.

## 2022-06-30 VITALS
WEIGHT: 168.1 LBS | HEIGHT: 67 IN | HEART RATE: 61 BPM | TEMPERATURE: 97 F | RESPIRATION RATE: 16 BRPM | DIASTOLIC BLOOD PRESSURE: 67 MMHG | OXYGEN SATURATION: 98 % | BODY MASS INDEX: 26.38 KG/M2 | SYSTOLIC BLOOD PRESSURE: 111 MMHG

## 2022-06-30 LAB
A/G RATIO: 1.3 (ref 1.1–2.2)
ALBUMIN SERPL-MCNC: 3.6 G/DL (ref 3.4–5)
ALP BLD-CCNC: 290 U/L (ref 40–129)
ALT SERPL-CCNC: 1757 U/L (ref 10–40)
ANION GAP SERPL CALCULATED.3IONS-SCNC: 11 MMOL/L (ref 3–16)
AST SERPL-CCNC: 1120 U/L (ref 15–37)
BILIRUB SERPL-MCNC: 11.8 MG/DL (ref 0–1)
BUN BLDV-MCNC: 10 MG/DL (ref 7–20)
CALCIUM SERPL-MCNC: 9.2 MG/DL (ref 8.3–10.6)
CHLORIDE BLD-SCNC: 102 MMOL/L (ref 99–110)
CO2: 23 MMOL/L (ref 21–32)
CREAT SERPL-MCNC: <0.5 MG/DL (ref 0.9–1.3)
GFR AFRICAN AMERICAN: >60
GFR NON-AFRICAN AMERICAN: >60
GLUCOSE BLD-MCNC: 92 MG/DL (ref 70–99)
HBV QNT LOG, IU/ML: 6.94 LOG IU/ML
HBV QNT, IU/ML: ABNORMAL IU/ML
INTERPRETATION: DETECTED
POTASSIUM REFLEX MAGNESIUM: 3.9 MMOL/L (ref 3.5–5.1)
SODIUM BLD-SCNC: 136 MMOL/L (ref 136–145)
TOTAL PROTEIN: 6.3 G/DL (ref 6.4–8.2)

## 2022-06-30 PROCEDURE — 99238 HOSP IP/OBS DSCHRG MGMT 30/<: CPT | Performed by: INTERNAL MEDICINE

## 2022-06-30 PROCEDURE — G0378 HOSPITAL OBSERVATION PER HR: HCPCS

## 2022-06-30 PROCEDURE — 6370000000 HC RX 637 (ALT 250 FOR IP): Performed by: INTERNAL MEDICINE

## 2022-06-30 PROCEDURE — 36415 COLL VENOUS BLD VENIPUNCTURE: CPT

## 2022-06-30 PROCEDURE — 2580000003 HC RX 258: Performed by: INTERNAL MEDICINE

## 2022-06-30 PROCEDURE — 80053 COMPREHEN METABOLIC PANEL: CPT

## 2022-06-30 PROCEDURE — 6370000000 HC RX 637 (ALT 250 FOR IP): Performed by: PHYSICIAN ASSISTANT

## 2022-06-30 RX ORDER — ONDANSETRON 4 MG/1
4 TABLET, ORALLY DISINTEGRATING ORAL EVERY 8 HOURS PRN
Qty: 20 TABLET | Refills: 0 | Status: SHIPPED | OUTPATIENT
Start: 2022-06-30

## 2022-06-30 RX ORDER — PANTOPRAZOLE SODIUM 40 MG/1
40 TABLET, DELAYED RELEASE ORAL
Qty: 30 TABLET | Refills: 3 | Status: SHIPPED | OUTPATIENT
Start: 2022-07-01

## 2022-06-30 RX ADMIN — PANTOPRAZOLE SODIUM 40 MG: 40 TABLET, DELAYED RELEASE ORAL at 06:11

## 2022-06-30 RX ADMIN — Medication 10 ML: at 09:01

## 2022-06-30 RX ADMIN — LIDOCAINE HYDROCHLORIDE: 20 SOLUTION ORAL; TOPICAL at 10:59

## 2022-06-30 RX ADMIN — SODIUM CHLORIDE, POTASSIUM CHLORIDE, SODIUM LACTATE AND CALCIUM CHLORIDE: 600; 310; 30; 20 INJECTION, SOLUTION INTRAVENOUS at 10:58

## 2022-06-30 NOTE — DISCHARGE SUMMARY
Name:  Blaise Nails  Room:  2472/2152-36  MRN:    [id number]    Discharge Summary      This discharge summary is in conjunction with a complete physical exam done on the day of discharge. Attending Physician: Dr. Shanti Hernández  Discharging Physician: Dr. April Mcnulty: 6/27/2022  Discharge:   6/30/2022    HPI:  The patient is a 51-year-old  male  presenting to hospital with chief complaints of 1-1/2 to 2-week history  of subacute onset of gradually progressively increasing yellowing of his  skin associated with right upper quadrant abdominal pain with some  nausea without any obvious vomiting, fevers or chills. No hemoptysis or  hematemesis. No melena or hematochezia. Diagnoses this Admission and Hospital Course     1. Acute hepatitis B  2. Ttransaminitis /hyperbilirubinemia  3. abd pain      - supportive care with IVF, nausea control.  Regular diet  - doubt acute cholecystitis  - hbv quantitative pending  - f/w LFT remains elevated but symptoms improved  - , GI consulted- plans for dc home today with continued monitoring of LFT   - given scripts   - consider tx for hep B as outpt     dvt prophylaxis     Procedures (Please Review Full Report for Details)  N/A    Consults    GI      Physical Exam at Discharge:    /67   Pulse 61   Temp 97 °F (36.1 °C) (Oral)   Resp 16   Ht 5' 7\" (1.702 m)   Wt 168 lb 1.6 oz (76.2 kg)   SpO2 98%   BMI 26.33 kg/m²     See today's progress note    CBC: Recent Labs     06/27/22 1627 06/29/22  0601   WBC 7.7 6.7   HGB 13.9 13.5   HCT 41.3 38.4*   MCV 91.0 89.5    228     BMP:   Recent Labs     06/27/22 1627 06/29/22  0601 06/30/22  0613    138 136   K 3.8 3.6 3.9    105 102   CO2 23 23 23   BUN 10 8 10   CREATININE 0.6* <0.5* <0.5*     LIVER PROFILE:   Recent Labs     06/27/22 1627 06/27/22  1627 06/28/22  0535 06/29/22  0601 06/30/22  0613   *   < > 882* 1,032* 1,120*   ALT 1,770*   < > 1,638* 1,598* 1,757*   LIPASE 28.0  --   -- --   --    BILIDIR  --   --  7.7*  --   --    BILITOT 9.7*   < > 9.8* 10.4* 11.8*   ALKPHOS 317*   < > 295* 288* 290*    < > = values in this interval not displayed. PT/INR:   Recent Labs     06/27/22  1627   PROTIME 13.6   INR 1.06     APTT:   Recent Labs     06/27/22 1627   APTT 39.1*     U/A:    Lab Results   Component Value Date/Time    COLORU DARK YELLOW 06/26/2022 04:00 PM    CLARITYU Clear 06/26/2022 04:00 PM    SPECGRAV 1.025 06/26/2022 04:00 PM    LEUKOCYTESUR Negative 06/26/2022 04:00 PM    BLOODU Negative 06/26/2022 04:00 PM    GLUCOSEU Negative 06/26/2022 04:00 PM       CULTURES  covid neg    RADIOLOGY  CT ABDOMEN PELVIS W IV CONTRAST Additional Contrast? None   Final Result   CT findings in correlation with the prior gallbladder ultrasound findings are   highly suspicious for acute acalculous cholecystitis. Clinical correlation   and follow-up nuclear medicine hepatobiliary imaging study would be helpful. XR CHEST PORTABLE   Final Result   No acute process. US GALLBLADDER RUQ   Final Result   Gallbladder wall thickening with possible gallbladder wall polyps, no   definite gallstones. No pericholecystic fluid. Negative sonographic   Ruth's sign.       Common bile duct not visualized               Discharge Medications     Medication List      START taking these medications    ondansetron 4 MG disintegrating tablet  Commonly known as: Zofran ODT  Take 1 tablet by mouth every 8 hours as needed for Nausea or Vomiting  Notes to patient: Use for nausea, can cause constipation or headaches     pantoprazole 40 MG tablet  Commonly known as: PROTONIX  Take 1 tablet by mouth every morning (before breakfast)  Start taking on: July 1, 2022  Notes to patient: Used to decrease stomach acid and protect throat            Where to Get Your Medications      You can get these medications from any pharmacy    Bring a paper prescription for each of these medications  · ondansetron 4 MG disintegrating tablet  · pantoprazole 40 MG tablet           Discharged in stable condition to home. Follow Up: Follow up with PCP.

## 2022-06-30 NOTE — PLAN OF CARE
Problem: Discharge Planning  Goal: Discharge to home or other facility with appropriate resources  Outcome: Progressing  Flowsheets (Taken 6/30/2022 1129)  Discharge to home or other facility with appropriate resources: Identify barriers to discharge with patient and caregiver     Problem: Pain  Goal: Verbalizes/displays adequate comfort level or baseline comfort level  Outcome: Progressing  Flowsheets (Taken 6/30/2022 1129)  Verbalizes/displays adequate comfort level or baseline comfort level: Encourage patient to monitor pain and request assistance     Problem: Safety - Adult  Goal: Free from fall injury  Outcome: Progressing

## 2022-06-30 NOTE — PLAN OF CARE
Problem: Discharge Planning  Goal: Discharge to home or other facility with appropriate resources  6/29/2022 2020 by Kathleen Caldwell RN  Outcome: Progressing  6/29/2022 1021 by Lucy Olvera RN  Outcome: Progressing  Flowsheets (Taken 6/29/2022 0296)  Discharge to home or other facility with appropriate resources: Identify barriers to discharge with patient and caregiver     Problem: Pain  Goal: Verbalizes/displays adequate comfort level or baseline comfort level  6/29/2022 2020 by Kathleen Caldwell RN  Outcome: Progressing  6/29/2022 1021 by Lucy Olvera RN  Outcome: Progressing  Flowsheets (Taken 6/29/2022 0845)  Verbalizes/displays adequate comfort level or baseline comfort level: Encourage patient to monitor pain and request assistance     Problem: Safety - Adult  Goal: Free from fall injury  6/29/2022 2020 by Kathleen Caldwell RN  Outcome: Progressing  6/29/2022 1021 by Lucy Olvera RN  Outcome: Progressing

## 2022-06-30 NOTE — PLAN OF CARE
Problem: Discharge Planning  Goal: Discharge to home or other facility with appropriate resources  6/30/2022 1339 by Jan Rowland RN  Outcome: Completed  6/30/2022 1129 by Jan Rowland RN  Outcome: Progressing  Flowsheets (Taken 6/30/2022 1129)  Discharge to home or other facility with appropriate resources: Identify barriers to discharge with patient and caregiver     Problem: Pain  Goal: Verbalizes/displays adequate comfort level or baseline comfort level  6/30/2022 1339 by Jan Rowland RN  Outcome: Completed  6/30/2022 1129 by Jan Rowland RN  Outcome: Progressing  Flowsheets (Taken 6/30/2022 1129)  Verbalizes/displays adequate comfort level or baseline comfort level: Encourage patient to monitor pain and request assistance     Problem: Safety - Adult  Goal: Free from fall injury  6/30/2022 1339 by Jan Rowland RN  Outcome: Completed  6/30/2022 1129 by Jan Rowland RN  Outcome: Progressing

## 2022-06-30 NOTE — FLOWSHEET NOTE
06/30/22 0845   Vital Signs   Temp 96.9 °F (36.1 °C)   Temp Source Oral   Heart Rate 53   Heart Rate Source Monitor   Resp 16   BP (!) 107/58   BP Location Right upper arm   BP Method Automatic   MAP (Calculated) 74.33   Patient Position Turns self   Level of Consciousness Alert (0)   MEWS Score 1   Oxygen Therapy   SpO2 96 %   O2 Device None (Room air)   Pt resting in bed upon arrival, alert and oriented x 4. VS obtained. Pt refused AM Lovenox despite education re: risk and benefits. Assessment completed. Denies any nausea/vomiting. Pt does report some discomfort on left side of his chest that he relates to \"past history of nodules. \" LTCA, denies cough or congestion. Pt unsure how long he has had discomfort but states he thinks for a \"small while. \" Notified Dr. Agnieszka Soni of discomfort and new orders at this time for GI cocktail.

## 2022-06-30 NOTE — PROGRESS NOTES
PROGRESS NOTE  S:42 yrs Patient  admitted on 6/27/2022 with Acute type B viral hepatitis [B16.9] . Today he complains of fatigue, jaundice, itching, and rash. He is tolerating diet. He passed 2x BMs yesterday. Exam:   Vitals:    06/30/22 0845   BP: (!) 107/58   Pulse: 53   Resp: 16   Temp: 96.9 °F (36.1 °C)   SpO2: 96%      General appearance: alert, appears stated age, cooperative, fatigued, icteric and no distress  HEENT: Neck supple with midline trachea, scleral icterus   Neck: no adenopathy and supple, symmetrical, trachea midline  Lungs: clear to auscultation bilaterally  Heart: regular rate and rhythm, S1, S2 normal, no murmur, click, rub or gallop  Abdomen: soft, non-tender; bowel sounds normal; no masses,  no organomegaly  Extremities: extremities normal, atraumatic, no cyanosis or edema     Medications: Reviewed    Labs:  CBC:   Recent Labs     06/27/22 1627 06/29/22  0601   WBC 7.7 6.7   HGB 13.9 13.5   HCT 41.3 38.4*   MCV 91.0 89.5    228     BMP:   Recent Labs     06/27/22  1627 06/29/22  0601 06/30/22  0613    138 136   K 3.8 3.6 3.9    105 102   CO2 23 23 23   BUN 10 8 10   CREATININE 0.6* <0.5* <0.5*     LIVER PROFILE:   Recent Labs     06/27/22 1627 06/27/22 1627 06/28/22  0535 06/29/22  0601 06/30/22  0613   *   < > 882* 1,032* 1,120*   ALT 1,770*   < > 1,638* 1,598* 1,757*   LIPASE 28.0  --   --   --   --    PROT 6.5   < > 5.9* 6.0* 6.3*   BILIDIR  --   --  7.7*  --   --    BILITOT 9.7*   < > 9.8* 10.4* 11.8*   ALKPHOS 317*   < > 295* 288* 290*    < > = values in this interval not displayed. PT/INR:   Recent Labs     06/27/22  1627   INR 1.06     Attending Supervising [de-identified] Attestation Statement  The patient is a 43 y.o. male. I have performed a history and physical examination of the patient.  I discussed the case with my physician assistant Adriana Chowdary PA-C    I reviewed the patient's Past Medical History, Past Surgical History, Medications, and Allergies. Physical Exam:  Vitals:    06/29/22 2013 06/30/22 0220 06/30/22 0845 06/30/22 1416   BP: 121/68 101/60 (!) 107/58 111/67   Pulse:  52 53 61   Resp: 16 16 16 16   Temp: 97.1 °F (36.2 °C) 98 °F (36.7 °C) 96.9 °F (36.1 °C) 97 °F (36.1 °C)   TempSrc: Oral Oral Oral Oral   SpO2: 100% 96% 96% 98%   Weight:       Height:           Physical Examination: General appearance - well hydrated  Mental status - alert, oriented to person, place, and time  Eyes - sclera icteric  Neck - supple, no significant adenopathy  Chest - no tachypnea, retractions or cyanosis  Heart - normal rate and regular rhythm  Abdomen - soft, nontender, nondistended  Extremities - no pedal edema noted  Skin-jaundice        Impression: 43year old male with a history of COPD and headaches admitted with acute HBV. Recommendation:  1. Continue supportive care  2. Monitor LFTs  3. Monitor and document output  4. Continue Pantoprazole 40 mg qAM   5. HBV DNA Quant - in process   6. Continue low fat diet  7. Encourage nutrition  8. Ambulate/Up in chair TID  9. Ok to d/c from GI standpoint  10. Repeat CMP in 2-3 days  11. Repeat CMP again in 5-6 days      Arjun Garces PA-C  11:26 AM 6/30/2022                      43year old male with COPD admitted with acute hepatitis B     Continue supportive care. Monitor LFTs. Encourage nutrition. Ambulate TID. Ok to d/c from GI standpoint.  Will need repeat CMP in 2d and again next week    Tita Tobias MD          (O) [phone]  [facility]

## 2022-06-30 NOTE — PROGRESS NOTES
IM Progress Note    Admit Date:  6/27/2022  0    Interval history:  Acute hepatitis B     Subjective:  Mr. Niki Welsh seen up in bed, no new issues, hurts in epigastrium if he takes a deep breath      LFT going up but no fevers, tolerating diet ok   Ambulating well      He thinks he had unprotected sexual activity about a month ago  No needle use or recent tattoos  Tolerating diet ok      Objective:   /60   Pulse 52   Temp 98 °F (36.7 °C) (Oral)   Resp 16   Ht 5' 7\" (1.702 m)   Wt 168 lb 1.6 oz (76.2 kg)   SpO2 96%   BMI 26.33 kg/m²       Intake/Output Summary (Last 24 hours) at 6/30/2022 0739  Last data filed at 6/30/2022 0612  Gross per 24 hour   Intake 1150 ml   Output --   Net 1150 ml       Physical Exam:        General: middle aged male,   Awake, alert and oriented. Appears to be not in any distress  Mucous Membranes:  Pink ,+icteric  Neck: No JVD, no carotid bruit, no thyromegaly  Chest:  Clear to auscultation bilaterally, no added sounds  Cardiovascular:  RRR S1S2 heard, no murmurs or gallops  Abdomen:  Soft, undistended, no  epigastric tenderness   No guarding  , no organomegaly, BS present  Extremities: some small tattoos on both UE  No edema or cyanosis.  Distal pulses well felt  Neurological : grossly normal      Medications:   Scheduled Medications:    pantoprazole  40 mg Oral QAM AC    sodium chloride flush  5-40 mL IntraVENous 2 times per day    enoxaparin  40 mg SubCUTAneous Daily     I   sodium chloride      lactated ringers 80 mL/hr at 06/29/22 2234     ALPRAZolam, sodium chloride flush, sodium chloride, ondansetron    Lab Data:  Recent Labs     06/27/22  1627 06/29/22  0601   WBC 7.7 6.7   HGB 13.9 13.5   HCT 41.3 38.4*   MCV 91.0 89.5    228     Recent Labs     06/27/22  1627 06/29/22  0601 06/30/22  0613    138 136   K 3.8 3.6 3.9    105 102   CO2 23 23 23   BUN 10 8 10   CREATININE 0.6* <0.5* <0.5*     No results for input(s): CKTOTAL, CKMB, CKMBINDEX, TROPONINI in the last 72 hours. Coagulation:   Lab Results   Component Value Date/Time    INR 1.06 06/27/2022 04:27 PM    APTT 39.1 06/27/2022 04:27 PM     Cardiac markers:   Lab Results   Component Value Date/Time    TROPONINI <0.01 06/26/2022 04:00 PM         Lab Results   Component Value Date    ALT 1,757 (H) 06/30/2022    AST 1,120 (H) 06/30/2022    ALKPHOS 290 (H) 06/30/2022    BILITOT 11.8 (H) 06/30/2022       Lab Results   Component Value Date    INR 1.06 06/27/2022    INR 1.08 06/26/2022    PROTIME 13.6 06/27/2022    PROTIME 13.9 06/26/2022       Radiology    Chest xray - no acute process    Ct abd    CT findings in correlation with the prior gallbladder ultrasound findings are   highly suspicious for acute acalculous cholecystitis.  Clinical correlation   and follow-up nuclear medicine hepatobiliary imaging study would be helpful    Us abd    Gallbladder wall thickening with possible gallbladder wall polyps, no   definite gallstones.  No pericholecystic fluid.  Negative sonographic   Ruth's sign. Common bile duct not visualized     Cultures:   covid neg      Assessment & Plan:      1. Acute hepatitis B  2. Ttransaminitis /hyperbilirubinemia  3. abd pain     - supportive care with IVF, nausea control.  Regular diet  - doubt acute cholecystitis  - hbv quantitative pending  - f/w LFT remains elevated but symptoms improved  - , GI consulted- plans for dc home today with continued monitoring of LFT   - given scripts   - consider tx for hep B as outpt    dvt prophylaxis     Wilian Siddiqui MD, 6/30/2022 7:39 AM

## 2022-06-30 NOTE — PROGRESS NOTES
Nurse educated pt on discharge planning and provided prescriptions for Protonix and Zofran. Pt provided with orders for lab work to be obtained Saturday and again on Tuesday. Pt aware to complete lab work and set up appointment with GI doctor and PCP. Pt's father arrived to hospital to get patient at 1440. All belongings sent with patient. Nurse transferred pt to [facility].

## 2022-07-08 ENCOUNTER — HOSPITAL ENCOUNTER (INPATIENT)
Age: 42
LOS: 2 days | Discharge: HOME | DRG: 283 | End: 2022-07-10
Attending: EMERGENCY MEDICINE | Admitting: INTERNAL MEDICINE
Payer: MEDICAID

## 2022-07-08 DIAGNOSIS — R74.8 ELEVATED LIVER ENZYMES: Primary | ICD-10-CM

## 2022-07-08 DIAGNOSIS — R11.0 NAUSEA: ICD-10-CM

## 2022-07-08 DIAGNOSIS — B16.9 ACUTE VIRAL HEPATITIS B WITHOUT COMA AND WITHOUT DELTA AGENT: ICD-10-CM

## 2022-07-08 DIAGNOSIS — R63.8 DECREASED ORAL INTAKE: ICD-10-CM

## 2022-07-08 LAB
A/G RATIO: 1.4 (ref 1.1–2.2)
ALBUMIN SERPL-MCNC: 3.5 G/DL (ref 3.4–5)
ALP BLD-CCNC: 270 U/L (ref 40–129)
ALT SERPL-CCNC: 1635 U/L (ref 10–40)
ANION GAP SERPL CALCULATED.3IONS-SCNC: 12 MMOL/L (ref 3–16)
AST SERPL-CCNC: 1072 U/L (ref 15–37)
BASE EXCESS VENOUS: -2.5 MMOL/L (ref -3–3)
BASOPHILS ABSOLUTE: 0 K/UL (ref 0–0.2)
BASOPHILS RELATIVE PERCENT: 0 %
BILIRUB SERPL-MCNC: 16.8 MG/DL (ref 0–1)
BILIRUBIN URINE: ABNORMAL
BLOOD, URINE: NEGATIVE
BUN BLDV-MCNC: 6 MG/DL (ref 7–20)
CALCIUM SERPL-MCNC: 8.9 MG/DL (ref 8.3–10.6)
CARBOXYHEMOGLOBIN: 3.6 % (ref 0–1.5)
CHLORIDE BLD-SCNC: 104 MMOL/L (ref 99–110)
CLARITY: ABNORMAL
CO2: 21 MMOL/L (ref 21–32)
COLOR: ABNORMAL
CREAT SERPL-MCNC: <0.5 MG/DL (ref 0.9–1.3)
EOSINOPHILS ABSOLUTE: 0 K/UL (ref 0–0.6)
EOSINOPHILS RELATIVE PERCENT: 0 %
GFR AFRICAN AMERICAN: >60
GFR NON-AFRICAN AMERICAN: >60
GLUCOSE BLD-MCNC: 122 MG/DL (ref 70–99)
GLUCOSE URINE: NEGATIVE MG/DL
HCO3 VENOUS: 20.8 MMOL/L (ref 23–29)
HCT VFR BLD CALC: 39.5 % (ref 40.5–52.5)
HEMOGLOBIN: 13.5 G/DL (ref 13.5–17.5)
INR BLD: 1.16 (ref 0.87–1.14)
KETONES, URINE: NEGATIVE MG/DL
LACTIC ACID: 1.3 MMOL/L (ref 0.4–2)
LEUKOCYTE ESTERASE, URINE: NEGATIVE
LIPASE: 24 U/L (ref 13–60)
LYMPHOCYTES ABSOLUTE: 1.4 K/UL (ref 1–5.1)
LYMPHOCYTES RELATIVE PERCENT: 21 %
MAGNESIUM: 2 MG/DL (ref 1.8–2.4)
MCH RBC QN AUTO: 30.5 PG (ref 26–34)
MCHC RBC AUTO-ENTMCNC: 34.2 G/DL (ref 31–36)
MCV RBC AUTO: 89.4 FL (ref 80–100)
METHEMOGLOBIN VENOUS: 0.3 %
MICROSCOPIC EXAMINATION: ABNORMAL
MONOCYTES ABSOLUTE: 0.7 K/UL (ref 0–1.3)
MONOCYTES RELATIVE PERCENT: 10 %
NEUTROPHILS ABSOLUTE: 4.8 K/UL (ref 1.7–7.7)
NEUTROPHILS RELATIVE PERCENT: 69 %
NITRITE, URINE: NEGATIVE
O2 CONTENT, VEN: 18 VOL %
O2 SAT, VEN: 97 %
O2 THERAPY: ABNORMAL
PCO2, VEN: 31.9 MMHG (ref 40–50)
PDW BLD-RTO: 20.5 % (ref 12.4–15.4)
PH UA: 5.5 (ref 5–8)
PH VENOUS: 7.43 (ref 7.35–7.45)
PLATELET # BLD: 253 K/UL (ref 135–450)
PLATELET SLIDE REVIEW: ADEQUATE
PMV BLD AUTO: 9 FL (ref 5–10.5)
PO2, VEN: 88.1 MMHG (ref 25–40)
POTASSIUM REFLEX MAGNESIUM: 3.5 MMOL/L (ref 3.5–5.1)
PROTEIN UA: NEGATIVE MG/DL
PROTHROMBIN TIME: 14.7 SEC (ref 11.7–14.5)
RBC # BLD: 4.42 M/UL (ref 4.2–5.9)
SARS-COV-2, NAAT: NOT DETECTED
SLIDE REVIEW: ABNORMAL
SODIUM BLD-SCNC: 137 MMOL/L (ref 136–145)
SPECIFIC GRAVITY UA: 1.02 (ref 1–1.03)
TCO2 CALC VENOUS: 22 MMOL/L
TOTAL PROTEIN: 6 G/DL (ref 6.4–8.2)
URINE REFLEX TO CULTURE: ABNORMAL
URINE TYPE: ABNORMAL
UROBILINOGEN, URINE: 1 E.U./DL
WBC # BLD: 6.9 K/UL (ref 4–11)

## 2022-07-08 PROCEDURE — 83735 ASSAY OF MAGNESIUM: CPT

## 2022-07-08 PROCEDURE — 94640 AIRWAY INHALATION TREATMENT: CPT

## 2022-07-08 PROCEDURE — 80053 COMPREHEN METABOLIC PANEL: CPT

## 2022-07-08 PROCEDURE — 6360000002 HC RX W HCPCS: Performed by: EMERGENCY MEDICINE

## 2022-07-08 PROCEDURE — 83690 ASSAY OF LIPASE: CPT

## 2022-07-08 PROCEDURE — 2580000003 HC RX 258

## 2022-07-08 PROCEDURE — 6370000000 HC RX 637 (ALT 250 FOR IP)

## 2022-07-08 PROCEDURE — 94761 N-INVAS EAR/PLS OXIMETRY MLT: CPT

## 2022-07-08 PROCEDURE — 96374 THER/PROPH/DIAG INJ IV PUSH: CPT

## 2022-07-08 PROCEDURE — 1200000000 HC SEMI PRIVATE

## 2022-07-08 PROCEDURE — 85025 COMPLETE CBC W/AUTO DIFF WBC: CPT

## 2022-07-08 PROCEDURE — 83605 ASSAY OF LACTIC ACID: CPT

## 2022-07-08 PROCEDURE — 99285 EMERGENCY DEPT VISIT HI MDM: CPT

## 2022-07-08 PROCEDURE — 99222 1ST HOSP IP/OBS MODERATE 55: CPT

## 2022-07-08 PROCEDURE — 36415 COLL VENOUS BLD VENIPUNCTURE: CPT

## 2022-07-08 PROCEDURE — 81003 URINALYSIS AUTO W/O SCOPE: CPT

## 2022-07-08 PROCEDURE — 2580000003 HC RX 258: Performed by: EMERGENCY MEDICINE

## 2022-07-08 PROCEDURE — 6370000000 HC RX 637 (ALT 250 FOR IP): Performed by: INTERNAL MEDICINE

## 2022-07-08 PROCEDURE — 82803 BLOOD GASES ANY COMBINATION: CPT

## 2022-07-08 PROCEDURE — 85610 PROTHROMBIN TIME: CPT

## 2022-07-08 PROCEDURE — 87635 SARS-COV-2 COVID-19 AMP PRB: CPT

## 2022-07-08 RX ORDER — SODIUM CHLORIDE 0.9 % (FLUSH) 0.9 %
5-40 SYRINGE (ML) INJECTION EVERY 12 HOURS SCHEDULED
Status: DISCONTINUED | OUTPATIENT
Start: 2022-07-08 | End: 2022-07-10 | Stop reason: HOSPADM

## 2022-07-08 RX ORDER — ALBUTEROL SULFATE 90 UG/1
2 AEROSOL, METERED RESPIRATORY (INHALATION) EVERY 4 HOURS PRN
COMMUNITY

## 2022-07-08 RX ORDER — POTASSIUM CHLORIDE 20 MEQ/1
40 TABLET, EXTENDED RELEASE ORAL PRN
Status: DISCONTINUED | OUTPATIENT
Start: 2022-07-08 | End: 2022-07-10 | Stop reason: HOSPADM

## 2022-07-08 RX ORDER — ONDANSETRON 4 MG/1
4 TABLET, ORALLY DISINTEGRATING ORAL EVERY 8 HOURS PRN
Status: DISCONTINUED | OUTPATIENT
Start: 2022-07-08 | End: 2022-07-10 | Stop reason: HOSPADM

## 2022-07-08 RX ORDER — ONDANSETRON 2 MG/ML
4 INJECTION INTRAMUSCULAR; INTRAVENOUS EVERY 6 HOURS PRN
Status: DISCONTINUED | OUTPATIENT
Start: 2022-07-08 | End: 2022-07-10 | Stop reason: HOSPADM

## 2022-07-08 RX ORDER — PANTOPRAZOLE SODIUM 40 MG/1
40 TABLET, DELAYED RELEASE ORAL
Status: DISCONTINUED | OUTPATIENT
Start: 2022-07-09 | End: 2022-07-10 | Stop reason: HOSPADM

## 2022-07-08 RX ORDER — SODIUM CHLORIDE 9 MG/ML
INJECTION, SOLUTION INTRAVENOUS CONTINUOUS
Status: DISCONTINUED | OUTPATIENT
Start: 2022-07-08 | End: 2022-07-10 | Stop reason: HOSPADM

## 2022-07-08 RX ORDER — POLYETHYLENE GLYCOL 3350 17 G/17G
17 POWDER, FOR SOLUTION ORAL DAILY PRN
Status: DISCONTINUED | OUTPATIENT
Start: 2022-07-08 | End: 2022-07-10 | Stop reason: HOSPADM

## 2022-07-08 RX ORDER — SODIUM CHLORIDE, SODIUM LACTATE, POTASSIUM CHLORIDE, AND CALCIUM CHLORIDE .6; .31; .03; .02 G/100ML; G/100ML; G/100ML; G/100ML
1000 INJECTION, SOLUTION INTRAVENOUS ONCE
Status: COMPLETED | OUTPATIENT
Start: 2022-07-08 | End: 2022-07-08

## 2022-07-08 RX ORDER — MAGNESIUM SULFATE 1 G/100ML
1000 INJECTION INTRAVENOUS PRN
Status: DISCONTINUED | OUTPATIENT
Start: 2022-07-08 | End: 2022-07-10 | Stop reason: HOSPADM

## 2022-07-08 RX ORDER — KETOROLAC TROMETHAMINE 30 MG/ML
15 INJECTION, SOLUTION INTRAMUSCULAR; INTRAVENOUS ONCE
Status: COMPLETED | OUTPATIENT
Start: 2022-07-08 | End: 2022-07-08

## 2022-07-08 RX ORDER — BUDESONIDE AND FORMOTEROL FUMARATE DIHYDRATE 160; 4.5 UG/1; UG/1
2 AEROSOL RESPIRATORY (INHALATION) 2 TIMES DAILY
Status: DISCONTINUED | OUTPATIENT
Start: 2022-07-08 | End: 2022-07-10 | Stop reason: HOSPADM

## 2022-07-08 RX ORDER — ALBUTEROL SULFATE 90 UG/1
2 AEROSOL, METERED RESPIRATORY (INHALATION) EVERY 4 HOURS PRN
Status: DISCONTINUED | OUTPATIENT
Start: 2022-07-08 | End: 2022-07-10 | Stop reason: HOSPADM

## 2022-07-08 RX ORDER — LANOLIN ALCOHOL/MO/W.PET/CERES
3 CREAM (GRAM) TOPICAL NIGHTLY PRN
Status: DISCONTINUED | OUTPATIENT
Start: 2022-07-08 | End: 2022-07-10 | Stop reason: HOSPADM

## 2022-07-08 RX ORDER — ENOXAPARIN SODIUM 100 MG/ML
40 INJECTION SUBCUTANEOUS DAILY
Status: DISCONTINUED | OUTPATIENT
Start: 2022-07-08 | End: 2022-07-10 | Stop reason: HOSPADM

## 2022-07-08 RX ORDER — BUDESONIDE AND FORMOTEROL FUMARATE DIHYDRATE 160; 4.5 UG/1; UG/1
2 AEROSOL RESPIRATORY (INHALATION) 2 TIMES DAILY
COMMUNITY
Start: 2022-07-05

## 2022-07-08 RX ORDER — SODIUM CHLORIDE 0.9 % (FLUSH) 0.9 %
10 SYRINGE (ML) INJECTION PRN
Status: DISCONTINUED | OUTPATIENT
Start: 2022-07-08 | End: 2022-07-10 | Stop reason: HOSPADM

## 2022-07-08 RX ORDER — SODIUM CHLORIDE 9 MG/ML
INJECTION, SOLUTION INTRAVENOUS PRN
Status: DISCONTINUED | OUTPATIENT
Start: 2022-07-08 | End: 2022-07-10 | Stop reason: HOSPADM

## 2022-07-08 RX ORDER — POTASSIUM CHLORIDE 7.45 MG/ML
10 INJECTION INTRAVENOUS PRN
Status: DISCONTINUED | OUTPATIENT
Start: 2022-07-08 | End: 2022-07-10 | Stop reason: HOSPADM

## 2022-07-08 RX ADMIN — SODIUM CHLORIDE: 9 INJECTION, SOLUTION INTRAVENOUS at 15:00

## 2022-07-08 RX ADMIN — KETOROLAC TROMETHAMINE 15 MG: 30 INJECTION, SOLUTION INTRAMUSCULAR; INTRAVENOUS at 10:46

## 2022-07-08 RX ADMIN — SODIUM CHLORIDE, POTASSIUM CHLORIDE, SODIUM LACTATE AND CALCIUM CHLORIDE 1000 ML: 600; 310; 30; 20 INJECTION, SOLUTION INTRAVENOUS at 10:01

## 2022-07-08 RX ADMIN — Medication 3 MG: at 22:05

## 2022-07-08 RX ADMIN — Medication 2 PUFF: at 18:46

## 2022-07-08 ASSESSMENT — PAIN DESCRIPTION - FREQUENCY: FREQUENCY: CONTINUOUS

## 2022-07-08 ASSESSMENT — PAIN DESCRIPTION - PAIN TYPE: TYPE: ACUTE PAIN

## 2022-07-08 ASSESSMENT — PAIN SCALES - GENERAL
PAINLEVEL_OUTOF10: 8
PAINLEVEL_OUTOF10: 2
PAINLEVEL_OUTOF10: 8

## 2022-07-08 ASSESSMENT — PAIN DESCRIPTION - LOCATION: LOCATION: ABDOMEN

## 2022-07-08 ASSESSMENT — PAIN DESCRIPTION - ONSET: ONSET: ON-GOING

## 2022-07-08 ASSESSMENT — PAIN - FUNCTIONAL ASSESSMENT: PAIN_FUNCTIONAL_ASSESSMENT: 0-10

## 2022-07-08 NOTE — PLAN OF CARE
Problem: Discharge Planning  Goal: Discharge to home or other facility with appropriate resources  Outcome: Progressing  Flowsheets (Taken 7/8/2022 1346)  Discharge to home or other facility with appropriate resources: Identify barriers to discharge with patient and caregiver     Problem: Pain  Goal: Verbalizes/displays adequate comfort level or baseline comfort level  Outcome: Progressing

## 2022-07-08 NOTE — PROGRESS NOTES
.Patient admitted to room 201 from ED. Patient ambulatory within room. Call light given and oriented on. Room oriented. VSS. Dietary called for tray. Bed alarm deferred due to patient being ambulatory. Four eyes complete; CHG wipes completed. Admission questions complete at this time. 4 Eyes Skin Assessment     The patient is being assess for   Admission    I agree that 2 RN's have performed a thorough Head to Toe Skin Assessment on the patient. ALL assessment sites listed below have been assessed. Areas assessed by both nurses:   [x]   Head, Face, and Ears   [x]   Shoulders, Back, and Chest, Abdomen  [x]   Arms, Elbows, and Hands   [x]   Coccyx, Sacrum, and Ischium  [x]   Legs, Feet, and Heels        Scattered bruising and abrasions. **SHARE this note so that the co-signing nurse is able to place an eSignature**    Co-signer eSignature: Electronically signed by Afshin Bahena RN on 7/8/22 at 6:48 PM EDT    Does the Patient have Skin Breakdown? No          Oswald Prevention initiated:  NA   Wound Care Orders initiated:  NA      [facility] nurse consulted for Pressure Injury (Stage 3,4, Unstageable, DTI, NWPT, Complex wounds)and New or Established Ostomies:  NA      Primary Nurse eSignature: Electronically signed by Jeremi Whaley RN on 7/8/22 at 2:36 PM EDT    . Patient is able to demonstrate the ability to move from a reclining position to an upright position within the recliner. .Bedside Mobility Assessment Tool (BMAT):     Assessment Level 1- Sit and Shake    1. From a semi-reclined position, ask patient to sit up and rotate to a seated position at the side of the bed. Can use the bedrail. 2. Ask patient to reach out and grab your hand and shake making sure patient reaches across his/her midline. Pass- Patient is able to come to a seated position, maintain core strength. Maintains seated balance while reaching across midline. Move on to Assessment Level 2.      Assessment Level 2- Stretch and Point   1. With patient in seated position at the side of the bed, have patient place both feet on the floor (or stool) with knees no higher than hips. 2. Ask patient to stretch one leg and straighten the knee, then bend the ankle/flex and point the toes. If appropriate, repeat with the other leg. Pass- Patient is able to demonstrate appropriate quad strength on intended weight bearing limb(s). Move onto Assessment Level 3. Assessment Level 3- Stand   1. Ask patient to elevate off the bed or chair (seated to standing) using an assistive device (cane, bedrail). 2. Patient should be able to raise buttocks off be and hold for a count of five. May repeat once. Pass- Patient maintains standing stability for at least 5 seconds, proceed to assessment level 4. Assessment Level 4- Walk   1. Ask patient to march in place at bedside. 2. Then ask patient to advance step and return each foot. Some medical conditions may render a patient from stepping backwards, use your best clinical judgement. Pass- Patient demonstrates balance while shifting weight and ability to step, takes independent steps, does not use assistive device patient is MOBILITY LEVEL 4.       Mobility Level- 4

## 2022-07-08 NOTE — CONSULTS
(PROVENTIL;VENTOLIN;PROAIR) 108 (90 Base) MCG/ACT inhaler Inhale 2 puffs into the lungs every 4 hours as needed for Shortness of Breath or Wheezing      SYMBICORT 160-4.5 MCG/ACT AERO Inhale 2 puffs into the lungs 2 times daily      pantoprazole (PROTONIX) 40 MG tablet Take 1 tablet by mouth every morning (before breakfast) 30 tablet 3    ondansetron (ZOFRAN ODT) 4 MG disintegrating tablet Take 1 tablet by mouth every 8 hours as needed for Nausea or Vomiting 20 tablet 0      Infusions:    sodium chloride      sodium chloride 100 mL/hr at 07/08/22 1500     PRN Medications: sodium chloride flush, sodium chloride, potassium chloride **OR** potassium alternative oral replacement **OR** potassium chloride, magnesium sulfate, ondansetron **OR** ondansetron, polyethylene glycol, albuterol sulfate HFA  Allergies: Allergies   Allergen Reactions    Other      ultram       ROS:   Constitutional: negative for chills, fevers and sweats  Eyes: negative for cataracts, and redness. Positive for icterus. Ears, nose, mouth, throat, and face: negative for epistaxis, hearing loss and sore throat  Respiratory: negative for cough, hemoptysis and sputum  Cardiovascular: negative for chest pain, dyspnea and lower extremity edema  Gastrointestinal: as per HPI  Genitourinary:negative for dysuria, frequency and hematuria  Neurological: negative for coordination problems, and gait problems. Positive for dizziness.    Behavioral/Psych: negative for anxiety, depression and mood swings    Physical Exam   /64   Pulse 57   Temp 98 °F (36.7 °C) (Oral)   Resp 18   Ht 5' 7\" (1.702 m)   Wt 165 lb (74.8 kg)   SpO2 98%   BMI 25.84 kg/m²       General appearance: alert, appears stated age, cooperative, fatigued, icteric, slowed mentation and syndromic appearance - ill-appearing  Head: Normocephalic, without obvious abnormality, atraumatic  Eyes: positive findings: sclera icteric  Neck: no adenopathy and supple, symmetrical, trachea midline  Lungs: clear to auscultation bilaterally  Heart: regular rate and rhythm, S1, S2 normal, no murmur, click, rub or gallop  Abdomen: normal findings: bowel sounds normal, no masses palpable and symmetric and abnormal findings:  distended and tenderness mild in the epigastrium and in the RUQ  Extremities: extremities normal, atraumatic, no cyanosis or edema    Lab and Imaging Review   Labs:  CBC:   Recent Labs     07/08/22 0947   WBC 6.9   HGB 13.5   HCT 39.5*   MCV 89.4        BMP:   Recent Labs     07/08/22  0947      K 3.5      CO2 21   BUN 6*   CREATININE <0.5*     LIVER PROFILE:   Recent Labs     07/08/22 0947   AST 1,072*   ALT 1,635*   LIPASE 24.0   PROT 6.0*   BILITOT 16.8*   ALKPHOS 270*     PT/INR:   Recent Labs     07/08/22 0947   INR 1.16*       IMAGING:  CT ABDOMEN PELVIS W IV CONTRAST - 6/27/2022  Impression   CT findings in correlation with the prior gallbladder ultrasound findings are   highly suspicious for acute acalculous cholecystitis.  Clinical correlation   and follow-up nuclear medicine hepatobiliary imaging study would be helpful. US GALLBLADDER RUQ - 6/27/2022  Impression   Gallbladder wall thickening with possible gallbladder wall polyps, no   definite gallstones.  No pericholecystic fluid.  Negative sonographic   Ruth's sign.       Common bile duct not visualized     Attending Supervising [de-identified] Attestation Statement  The patient is a 43 y.o. male. I have performed a history and physical examination of the patient. I discussed the case with my physician assistant Chance Diop PA-C    I reviewed the patient's Past Medical History, Past Surgical History, Medications, and Allergies.      Physical Exam:  Vitals:    07/08/22 0928 07/08/22 1047 07/08/22 1315   BP: 111/63 112/69 112/64   Pulse: 63 66 57   Resp: 18 18 18   Temp: 98 °F (36.7 °C)  98 °F (36.7 °C)   TempSrc:   Oral   SpO2: 98% 97% 98%   Weight: 165 lb (74.8 kg)     Height: 5' 7\" (1.702 m) Physical Examination: General appearance - alert, well appearing, and in mild distress  Mental status - alert, oriented to person, place, and time  Eyes - sclera icteric  Neck - supple, no significant adenopathy  Chest - no tachypnea, retractions or cyanosis  Heart - normal rate and regular rhythm  Abdomen - soft, nontender, nondistended, no masses or organomegaly  Extremities - no pedal edema noted       Assessment:     43year old male with a history of COPD, headaches, arthritis, and acute HBV admitted with acute HBV. Plan:   1. Continue supportive care  2. Monitor LFTs  3. Monitor and document output  4. Monitor and replenish electrolytes  5. IV nausea control  6. Continue Pantoprazole qAM  7. Continue diet as tolerated  8. Encourage nutrition  9. Will follow      Lorenza Seaman PA-C  3:18 PM 7/8/2022                      43year old male with a history of COPD, arthritis admitted with anorexia secondary to acute hepatitis B    Continue supportive care. IVF resuscitation and antiemetics. Start PPI daily. Encourage nutrition with supplements. Monitor LFTs.  No indication for antiviral therapy at this time    Jose D Villafuerte MD          [phone]  [facility]

## 2022-07-08 NOTE — PROGRESS NOTES
.Shift report given to Hendricks Councilman RN at bedside. . Patient is stable. All end of shift needs have been met. No further assistance needed at this time.

## 2022-07-08 NOTE — ED NOTES
1115  Call for GI consult placed with [phone] consult completed by [facility]     Logan Perez  07/08/22 1122    1220  GI consult completed by Dr. Kiran Reece  07/08/22 122

## 2022-07-08 NOTE — ED PROVIDER NOTES
CHIEF COMPLAINT  Other (reports seen PCP and had blood work done, dx'd with Hep B & C reports liver function is worse than its ever been hasn't been able to keep anything down. )      HISTORY OF PRESENT ILLNESS  Beatriz Morgan is a 43 y.o. male with a history of COPD, recent diagnosis of hepatitis B who presents emerged department for evaluation of worsening jaundice, abdominal pain. He had recent hospitalization, diagnosed with hepatitis B. He states that his liver enzymes has been getting worse. He did get outpatient labs yesterday states that her this liver function has been worsening. He has had increased yellowing of his skin and eyes. He has had no fevers. Denies any IV drug use. No other complaints, modifying factors or associated symptoms. I have reviewed the following from the nursing documentation. Past Medical History:   Diagnosis Date    Arthritis     COPD (chronic obstructive pulmonary disease) ([facility] [address]     Liver damage      No past surgical history on file.   Family History   Problem Relation Age of Onset    Cancer Father     Heart Disease Paternal Grandfather      Social History     Socioeconomic History    Marital status: Single     Spouse name: Not on file    Number of children: Not on file    Years of education: Not on file    Highest education level: Not on file   Occupational History    Not on file   Tobacco Use    Smoking status: Current Every Day Smoker     Packs/day: 0.50    Smokeless tobacco: Never Used   Vaping Use    Vaping Use: Never used   Substance and Sexual Activity    Alcohol use: Yes     Comment: social    Drug use: Yes     Types: Marijuana Shabana Tobi)     Comment: daily    Sexual activity: Not on file   Other Topics Concern    Not on file   Social History Narrative    Not on file     Social Determinants of Health     Financial Resource Strain:     Difficulty of Paying Living Expenses: Not on file   Food Insecurity:     Worried About Running Out of Food in the Last Year: Not on file    Ran Out of Food in the Last Year: Not on file   Transportation Needs:     Lack of Transportation (Medical): Not on file    Lack of Transportation (Non-Medical):  Not on file   Physical Activity:     Days of Exercise per Week: Not on file    Minutes of Exercise per Session: Not on file   Stress:     Feeling of Stress : Not on file   Social Connections:     Frequency of Communication with Friends and Family: Not on file    Frequency of Social Gatherings with Friends and Family: Not on file    Attends Taoist Services: Not on file    Active Member of Clubs or Organizations: Not on file    Attends Club or Organization Meetings: Not on file    Marital Status: Not on file   Intimate Partner Violence:     Fear of Current or Ex-Partner: Not on file    Emotionally Abused: Not on file    Physically Abused: Not on file    Sexually Abused: Not on file   Housing Stability:     Unable to Pay for Housing in the Last Year: Not on file    Number of Places Lived in the Last Year: Not on file    Unstable Housing in the Last Year: Not on file     Current Facility-Administered Medications   Medication Dose Route Frequency Provider Last Rate Last Admin    sodium chloride flush 0.9 % injection 5-40 mL  5-40 mL IntraVENous 2 times per day Marthenia Regulus, PA        sodium chloride flush 0.9 % injection 10 mL  10 mL IntraVENous PRN Marthenia Regulus, PA        0.9 % sodium chloride infusion   IntraVENous PRN Marthenia Regulus, PA        potassium chloride (KLOR-CON M) extended release tablet 40 mEq  40 mEq Oral PRN Marthenia Regulus, PA        Or    potassium bicarb-citric acid (EFFER-K) effervescent tablet 40 mEq  40 mEq Oral PRN Marthenia Regulus, PA        Or    potassium chloride 10 mEq/100 mL IVPB (Peripheral Line)  10 mEq IntraVENous PRN Marthenia Regulus, PA        magnesium sulfate 1000 mg in dextrose 5% 100 mL IVPB  1,000 mg IntraVENous PRN Marthenia Regulus, PA        enoxaparin (LOVENOX) injection 40 mg  40 mg SubCUTAneous Daily DANYELL Alamo        ondansetron (ZOFRAN-ODT) disintegrating tablet 4 mg  4 mg Oral Q8H PRN DANYELL Alamo        Or    ondansetron (ZOFRAN) injection 4 mg  4 mg IntraVENous Q6H PRN DANYELL Alamo        polyethylene glycol (GLYCOLAX) packet 17 g  17 g Oral Daily PRN DANYELL Alamo        0.9 % sodium chloride infusion   IntraVENous Continuous DANYELL Alamo 100 mL/hr at 07/08/22 1500 New Bag at 07/08/22 1500    [START ON 7/9/2022] pantoprazole (PROTONIX) tablet 40 mg  40 mg Oral QAM AC DANYELL Mcginnis        budesonide-formoterol (SYMBICORT) 160-4.5 MCG/ACT inhaler 2 puff  2 puff Inhalation BID DANYELL Smith        albuterol sulfate HFA (PROVENTIL;VENTOLIN;PROAIR) 108 (90 Base) MCG/ACT inhaler 2 puff  2 puff Inhalation Q4H PRN DANYELL Smith         Allergies   Allergen Reactions    Other      ultram       REVIEW OF SYSTEMS  Positive and pertinent negatives as per HPI. All other systems were reviewed and are negative. PHYSICAL EXAM  /64   Pulse 57   Temp 98 °F (36.7 °C) (Oral)   Resp 18   Ht 5' 7\" (1.702 m)   Wt 165 lb (74.8 kg)   SpO2 98%   BMI 25.84 kg/m²   GENERAL APPEARANCE: Awake and alert. Cooperative. HEAD: Normocephalic. Atraumatic. HEART: RRR. No harsh murmurs. Intact radial pulses 2+ bilaterally. LUNGS: Respirations unlabored without accessory muscle use. CTAB. Good air exchange. No wheezes, rales, or rhonchi. Speaking comfortably in full sentences. ABDOMEN: Soft. Non-distended. Mild right upper quadrant tenderness without rebound no guarding or rebound. EXTREMITIES: No peripheral edema. No acute deformities. SKIN: Warm and dry. No acute rashes. NEUROLOGICAL: Alert and oriented X 3. No focal deficits    LABS  I have reviewed all labs for this visit.    Results for orders placed or performed during the hospital encounter of 07/08/22   COVID-19, Rapid    Specimen: Nasopharyngeal Swab   Result Value Ref Range    SARS-CoV-2, NAAT Not Detected Not Detected   CBC with Auto Differential   Result Value Ref Range    WBC 6.9 4.0 - 11.0 K/uL    RBC 4.42 4.20 - 5.90 M/uL    Hemoglobin 13.5 13.5 - 17.5 g/dL    Hematocrit 39.5 (L) 40.5 - 52.5 %    MCV 89.4 80.0 - 100.0 fL    MCH 30.5 26.0 - 34.0 pg    MCHC 34.2 31.0 - 36.0 g/dL    RDW 20.5 (H) 12.4 - 15.4 %    Platelets 077 528 - 386 K/uL    MPV 9.0 5.0 - 10.5 fL    PLATELET SLIDE REVIEW Adequate     SLIDE REVIEW see below     Neutrophils % 69.0 %    Lymphocytes % 21.0 %    Monocytes % 10.0 %    Eosinophils % 0.0 %    Basophils % 0.0 %    Neutrophils Absolute 4.8 1.7 - 7.7 K/uL    Lymphocytes Absolute 1.4 1.0 - 5.1 K/uL    Monocytes Absolute 0.7 0.0 - 1.3 K/uL    Eosinophils Absolute 0.0 0.0 - 0.6 K/uL    Basophils Absolute 0.0 0.0 - 0.2 K/uL   Comprehensive Metabolic Panel w/ Reflex to MG   Result Value Ref Range    Sodium 137 136 - 145 mmol/L    Potassium reflex Magnesium 3.5 3.5 - 5.1 mmol/L    Chloride 104 99 - 110 mmol/L    CO2 21 21 - 32 mmol/L    Anion Gap 12 3 - 16    Glucose 122 (H) 70 - 99 mg/dL    BUN 6 (L) 7 - 20 mg/dL    CREATININE <0.5 (L) 0.9 - 1.3 mg/dL    GFR Non-African American >60 >60    GFR African American >60 >60    Calcium 8.9 8.3 - 10.6 mg/dL    Total Protein 6.0 (L) 6.4 - 8.2 g/dL    Albumin 3.5 3.4 - 5.0 g/dL    Albumin/Globulin Ratio 1.4 1.1 - 2.2    Total Bilirubin 16.8 (H) 0.0 - 1.0 mg/dL    Alkaline Phosphatase 270 (H) 40 - 129 U/L    ALT 1,635 (H) 10 - 40 U/L    AST 1,072 (H) 15 - 37 U/L   Lipase   Result Value Ref Range    Lipase 24.0 13.0 - 60.0 U/L   Protime-INR   Result Value Ref Range    Protime 14.7 (H) 11.7 - 14.5 sec    INR 1.16 (H) 0.87 - 1.14   Blood Gas, Venous   Result Value Ref Range    pH, See 7.432 7.350 - 7.450    pCO2, See 31.9 (L) 40.0 - 50.0 mmHg    pO2, See 88.1 (H) 25.0 - 40.0 mmHg    HCO3, Venous 20.8 (L) 23.0 - 29.0 mmol/L    Base Excess, See -2.5 -3.0 - 3.0 mmol/L    O2 Sat, See 97 Not Established %    Carboxyhemoglobin 3.6 (H) 0.0 - 1.5 %    MetHgb, See 0.3 <1.5 %    TC02 (Calc), See 22 Not Established mmol/L    O2 Content, See 18 Not Established VOL %    O2 Therapy Unknown    Urinalysis with Reflex to Culture    Specimen: Urine   Result Value Ref Range    Color, UA DK YELLOW Straw/Yellow    Clarity, UA SL CLOUDY (A) Clear    Glucose, Ur Negative Negative mg/dL    Bilirubin Urine LARGE (A) Negative    Ketones, Urine Negative Negative mg/dL    Specific Gravity, UA 1.025 1.005 - 1.030    Blood, Urine Negative Negative    pH, UA 5.5 5.0 - 8.0    Protein, UA Negative Negative mg/dL    Urobilinogen, Urine 1.0 <2.0 E.U./dL    Nitrite, Urine Negative Negative    Leukocyte Esterase, Urine Negative Negative    Microscopic Examination Not Indicated     Urine Type NotGiven     Urine Reflex to Culture Not Indicated    Lactic Acid   Result Value Ref Range    Lactic Acid 1.3 0.4 - 2.0 mmol/L   Magnesium   Result Value Ref Range    Magnesium 2.00 1.80 - 2.40 mg/dL           RADIOLOGY  X-RAYS:  I have reviewed radiologic plain film image(s). ALL OTHER NON-PLAIN FILM IMAGES SUCH AS CT, ULTRASOUND AND MRI HAVE BEEN READ BY THE RADIOLOGIST. No orders to display          No results found.        During the patient's ED course, the patient was given:  Medications   sodium chloride flush 0.9 % injection 5-40 mL (has no administration in time range)   sodium chloride flush 0.9 % injection 10 mL (has no administration in time range)   0.9 % sodium chloride infusion (has no administration in time range)   potassium chloride (KLOR-CON M) extended release tablet 40 mEq (has no administration in time range)     Or   potassium bicarb-citric acid (EFFER-K) effervescent tablet 40 mEq (has no administration in time range)     Or   potassium chloride 10 mEq/100 mL IVPB (Peripheral Line) (has no administration in time range)   magnesium sulfate 1000 mg in dextrose 5% 100 mL IVPB (has no administration in time range)   enoxaparin (LOVENOX) injection 40 mg (40 mg SubCUTAneous Not Given 7/8/22 1350)   ondansetron (ZOFRAN-ODT) disintegrating tablet 4 mg (has no administration in time range)     Or   ondansetron (ZOFRAN) injection 4 mg (has no administration in time range)   polyethylene glycol (GLYCOLAX) packet 17 g (has no administration in time range)   0.9 % sodium chloride infusion ( IntraVENous New Bag 7/8/22 1500)   pantoprazole (PROTONIX) tablet 40 mg (has no administration in time range)   budesonide-formoterol (SYMBICORT) 160-4.5 MCG/ACT inhaler 2 puff (2 puffs Inhalation Not Given 7/8/22 1615)   albuterol sulfate HFA (PROVENTIL;VENTOLIN;PROAIR) 108 (90 Base) MCG/ACT inhaler 2 puff (has no administration in time range)   lactated ringers bolus (0 mLs IntraVENous Stopped 7/8/22 1103)   ketorolac (TORADOL) injection 15 mg (15 mg IntraVENous Given 7/8/22 1046)        ED COURSE/MDM  Patient seen and evaluated. Old records reviewed. Labs and imaging reviewed and results discussed with patient. 40-year-old male presenting with abdominal pain, nausea, decreased ability to tolerate p.o. in the setting of recent diagnosis hepatitis B, worsening jaundice. Laboratory evaluation reveals worsening elevation of bilirubin to 16.8, ALT and AST are 1635, 1072 respectively which is roughly about the same as it was during the previous hospitalization. GI consultation has been ordered. Because of his antibiotic tolerate p.o., continue abdominal pain, patient will be admitted for further evaluation and management. He is given Toradol for pain and IV fluids. Admitted to hospitalist in stable condition. Patient was given scripts for the following medications. I counseled patient how to take these medications. Current Discharge Medication List          CLINICAL IMPRESSION  1. Elevated liver enzymes    2. Acute viral hepatitis B without coma and without delta agent    3. Nausea    4.  Decreased oral intake        Blood pressure 112/64, pulse 57, temperature 98 °F (36.7 °C), temperature source Oral, resp. rate 18, height 5' 7\" (1.702 m), weight 165 lb (74.8 kg), SpO2 98 %. DISPOSITION  Rose Berrios was admitted in stable condition. This chart was generated in part by using Dragon Dictation system and may contain errors related to that system including errors in grammar, punctuation, and spelling, as well as words and phrases that may be inappropriate. If there are any questions or concerns please feel free to contact the dictating provider for clarification.         Brigette Devine MD  07/08/22 4689

## 2022-07-08 NOTE — H&P
Hospital Medicine History & Physical      PCP: No primary care provider on file. Date of Admission: 7/8/2022    Date of Service: Pt seen/examined on 7/8/2022      Chief Complaint:    Chief Complaint   Patient presents with    Other     reports seen PCP and had blood work done, dx'd with Hep B & C reports liver function is worse than its ever been hasn't been able to keep anything down. History Of Present Illness: The patient is a 43 y.o. male with Hep B (recently dx) and COPD who presented to [facility] ED. Patient was recently diagnosed with Hepatitis B. He had been experiencing increased yellowing of skin and RUQ pain. He was admitted here from 6/27 - 6/30. He was discharged home in stable condition with OP GI follow up and LFT monitoring. He was seen for repeat labs at [facility] and returns today with concern for persistently elevated LFTs and worsening sxs. He states that initially he was feeling ok but over the past few days he has experienced extreme fatigue, poor appetite, diffuse abdominal pain worse in the right upper quadrant, nausea, heartburn, intermittent diffuse skin itching, tea colored urine, \"foggy\" vision as well as \"brain fog\" that he describes as just not feeling like himself. He denies fevers, chills, cough, chest pain, shortness of breath, vomiting, numbness, tingling or swelling. He states he is worried and just \"wants to make sure I'm doing everything to get better\". He denies hx of IVDU or recent tattoos. He endorses unprotected sex with a few women within the last 3 months he thinks. His dad accompanies him and states he knows that \"some of the women he's with shoot up\". Vitals are stable. He is on room air. Apart from his liver profile his labs are largely unremarkable. Liver enzymes appear stable from recent admission. Bilirubin continues to rise since admission. He is admitted for further care.      Past Medical History:        Diagnosis Date    Arthritis     COPD (chronic obstructive pulmonary disease) (HCC)     Liver damage        Past Surgical History:    No past surgical history on file. Medications Prior to Admission:    Prior to Admission medications    Medication Sig Start Date End Date Taking? Authorizing Provider   albuterol sulfate HFA (PROVENTIL;VENTOLIN;PROAIR) 108 (90 Base) MCG/ACT inhaler Inhale 2 puffs into the lungs every 4 hours as needed for Shortness of Breath or Wheezing    Historical Provider, MD   SYMBICORT 160-4.5 MCG/ACT AERO Inhale 2 puffs into the lungs 2 times daily 7/5/22   Historical Provider, MD   pantoprazole (PROTONIX) 40 MG tablet Take 1 tablet by mouth every morning (before breakfast) 7/1/22   Zaida Hamilton MD   ondansetron (ZOFRAN ODT) 4 MG disintegrating tablet Take 1 tablet by mouth every 8 hours as needed for Nausea or Vomiting 6/30/22   Zaida Hamilton MD       Allergies: Other    Social History:  The patient currently lives at home    TOBACCO:   reports that he has been smoking. He has been smoking about 0.50 packs per day. He has never used smokeless tobacco.  ETOH:   reports current alcohol use.       Family History:   Positive as follows:        Problem Relation Age of Onset    Cancer Father     Heart Disease Paternal Grandfather        REVIEW OF SYSTEMS:       Constitutional: Negative for fever + fatigue, + poor appetite, + \"brain fog\"  HENT: Negative for sore throat, + yellowing eyes  Eyes: Negative for redness, + \"foggy\" vision  Respiratory: Negative  for dyspnea, cough   Cardiovascular: Negative for chest pain   Gastrointestinal: Negative for vomiting, diarrhea + abd pain, + nausea  Genitourinary: Negative for hematuria   Musculoskeletal: Negative for arthralgias   Skin: Negative for rash, + itching + yellowing skin  Neurological: Negative for syncope   Hematological: Negative for adenopathy   Psychiatric/Behavorial: Negative for anxiety    PHYSICAL EXAM:    /64   Pulse 57   Temp 98 °F (36.7 °C) (Oral)   Resp 18 Ht 5' 7\" (1.702 m)   Wt 165 lb (74.8 kg)   SpO2 98%   BMI 25.84 kg/m²     Gen: No distress. Alert. Eyes: PERRL. + sclera icterus. No conjunctival injection. ENT: No discharge. Pharynx clear. Neck: No JVD. Trachea midline. Resp: No accessory muscle use. No crackles. No wheezes. No rhonchi. CV: Regular rate. Regular rhythm. No murmur. No rub. No edema. Capillary Refill: Brisk,< 3 seconds   Peripheral Pulses: +2 palpable, equal bilaterally   GI: Diffuse TTP worse in RUQ. Negative Ruth's sign. Non-distended. No masses. No organomegaly. Normal bowel sounds. No hernia. Skin: Warm and dry. No nodule on exposed extremities. No rash on exposed extremities. + Diffuse jaundice  M/S: No cyanosis. No joint deformity. No clubbing. Neuro: Awake. Grossly nonfocal    Psych: Oriented x 3. No anxiety or agitation.      CBC:   Recent Labs     07/08/22  0947   WBC 6.9   HGB 13.5   HCT 39.5*   MCV 89.4        BMP:   Recent Labs     07/08/22  0947      K 3.5      CO2 21   BUN 6*   CREATININE <0.5*     LIVER PROFILE:   Recent Labs     07/08/22  0947   AST 1,072*   ALT 1,635*   LIPASE 24.0   BILITOT 16.8*   ALKPHOS 270*     PT/INR:   Recent Labs     07/08/22  0947   PROTIME 14.7*   INR 1.16*     UA:  Recent Labs     07/08/22  0947   COLORU DK YELLOW   PHUR 5.5   CLARITYU SL CLOUDY*   SPECGRAV 1.025   LEUKOCYTESUR Negative   UROBILINOGEN 1.0   BILIRUBINUR LARGE*   BLOODU Negative   GLUCOSEU Negative       U/A:    Lab Results   Component Value Date/Time    COLORU DK YELLOW 07/08/2022 09:47 AM    CLARITYU SL CLOUDY 07/08/2022 09:47 AM    SPECGRAV 1.025 07/08/2022 09:47 AM    LEUKOCYTESUR Negative 07/08/2022 09:47 AM    BLOODU Negative 07/08/2022 09:47 AM    GLUCOSEU Negative 07/08/2022 09:47 AM       CULTURES  COVID 19 pending    EKG:  I have reviewed the EKG with the following interpretation:   NA    RADIOLOGY  No orders to display      Pertinent previous results reviewed   Results for Lopez Right BRET (MRN [id number]) as of 7/8/2022 12:06   Ref. Range 6/26/2022 16:00   Hep A IgM Latest Ref Range: Non-reactive  Non-reactive   Hep B S Ag Interp Latest Ref Range: Non-reactive  Reactive (A)   Hep C Ab Interp Latest Ref Range: Non-reactive  Non-reactive   Hep B Core Ab, IgM Latest Ref Range: Non-reactive  REACTIVE (A)     Results for Tawanda Cruz (MRN [id number]) as of 7/8/2022 12:06   Ref. Range 6/28/2022 14:59   HBV Quantitative, log IU/ML Latest Units: log IU/mL 6.94   HBV Quantitative, IU/ML Latest Units: IU/mL 1,437,260       ASSESSMENT/PLAN:  #Acute hepatitis B infection  #Transaminitis   #Hyperbilirubinemia   -LFTs stable from previous admission  -Bilirubin continues to trend up 16.8  -Hepatitis panel above  6/28/2022 14:59  HBV Quantitative, log IU/ML: 6.94  HBV Quantitative, IU/ML: 5,486,596  -IV PPI  -Cont IVF  -Monitor CMP  -GI consulted    #COPD without exacerbation  -Continue home inhalers    #Anxiety  -Previously treated for this, no home meds at this time  -In process of reestablishing with psych OP    DVT Prophylaxis: Lovenox  Diet: ADULT DIET;  Regular; Low Fat (less than or equal to 50 gm/day)  Code Status: Full Code    Bar Richards PA-C  7/8/2022 1:33 PM

## 2022-07-08 NOTE — PLAN OF CARE
44 y/o male with pmhx of recent diagnosis of acute Hep B, COPD    Presented for concern over persistently elevated LFTs, nausea/vomiting, weakness, lack of PO intake     -IVF  -prn zofran  -low fat diet  -GI consulted    Admitted to [facility]    Lenward Curling, PA  07/08/22  11:23 AM

## 2022-07-08 NOTE — PROGRESS NOTES
RT Inhaler-Nebulizer Bronchodilator Protocol Note    There is a bronchodilator order in the chart from a provider indicating to follow the RT Bronchodilator Protocol and there is an Initiate RT Inhaler-Nebulizer Bronchodilator Protocol order as well (see protocol at bottom of note). CXR Findings:  No results found. The findings from the last RT Protocol Assessment were as follows:   History Pulmonary Disease: (P) Chronic pulmonary disease  Respiratory Pattern: (P) Regular pattern and RR 12-20 bpm  Breath Sounds: (P) Clear breath sounds  Cough: (P) Strong, spontaneous, non-productive  Indication for Bronchodilator Therapy: (P) On home bronchodilators  Bronchodilator Assessment Score: (P) 2    Aerosolized bronchodilator medication orders have been revised according to the RT Inhaler-Nebulizer Bronchodilator Protocol below. Respiratory Therapist to perform RT Therapy Protocol Assessment initially then follow the protocol. Repeat RT Therapy Protocol Assessment PRN for score 0-3 or on second treatment, BID, and PRN for scores above 3. No Indications - adjust the frequency to every 6 hours PRN wheezing or bronchospasm, if no treatments needed after 48 hours then discontinue using Per Protocol order mode. If indication present, adjust the RT bronchodilator orders based on the Bronchodilator Assessment Score as indicated below. Use Inhaler orders unless patient has one or more of the following: on home nebulizer, not able to hold breath for 10 seconds, is not alert and oriented, cannot activate and use MDI correctly, or respiratory rate 25 breaths per minute or more, then use the equivalent nebulizer order(s) with same Frequency and PRN reasons based on the score. If a patient is on this medication at home then do not decrease Frequency below that used at home.     0-3 - enter or revise RT bronchodilator order(s) to equivalent RT Bronchodilator order with Frequency of every 4 hours PRN for wheezing or

## 2022-07-09 LAB
A/G RATIO: 1.4 (ref 1.1–2.2)
ALBUMIN SERPL-MCNC: 3 G/DL (ref 3.4–5)
ALP BLD-CCNC: 236 U/L (ref 40–129)
ALT SERPL-CCNC: 1133 U/L (ref 10–40)
ANION GAP SERPL CALCULATED.3IONS-SCNC: 10 MMOL/L (ref 3–16)
AST SERPL-CCNC: 695 U/L (ref 15–37)
BASOPHILS ABSOLUTE: 0 K/UL (ref 0–0.2)
BASOPHILS RELATIVE PERCENT: 0.4 %
BILIRUB SERPL-MCNC: 12.4 MG/DL (ref 0–1)
BUN BLDV-MCNC: 7 MG/DL (ref 7–20)
CALCIUM SERPL-MCNC: 8.4 MG/DL (ref 8.3–10.6)
CHLORIDE BLD-SCNC: 106 MMOL/L (ref 99–110)
CO2: 23 MMOL/L (ref 21–32)
CREAT SERPL-MCNC: <0.5 MG/DL (ref 0.9–1.3)
EOSINOPHILS ABSOLUTE: 0.4 K/UL (ref 0–0.6)
EOSINOPHILS RELATIVE PERCENT: 6.4 %
GFR AFRICAN AMERICAN: >60
GFR NON-AFRICAN AMERICAN: >60
GLUCOSE BLD-MCNC: 98 MG/DL (ref 70–99)
HCT VFR BLD CALC: 35.3 % (ref 40.5–52.5)
HEMOGLOBIN: 12 G/DL (ref 13.5–17.5)
LYMPHOCYTES ABSOLUTE: 1.2 K/UL (ref 1–5.1)
LYMPHOCYTES RELATIVE PERCENT: 19.4 %
MCH RBC QN AUTO: 30.1 PG (ref 26–34)
MCHC RBC AUTO-ENTMCNC: 34.1 G/DL (ref 31–36)
MCV RBC AUTO: 88.4 FL (ref 80–100)
MONOCYTES ABSOLUTE: 0.9 K/UL (ref 0–1.3)
MONOCYTES RELATIVE PERCENT: 14.7 %
NEUTROPHILS ABSOLUTE: 3.8 K/UL (ref 1.7–7.7)
NEUTROPHILS RELATIVE PERCENT: 59.1 %
PDW BLD-RTO: 20.4 % (ref 12.4–15.4)
PLATELET # BLD: 214 K/UL (ref 135–450)
PMV BLD AUTO: 9.3 FL (ref 5–10.5)
POTASSIUM REFLEX MAGNESIUM: 3.6 MMOL/L (ref 3.5–5.1)
RBC # BLD: 3.99 M/UL (ref 4.2–5.9)
SODIUM BLD-SCNC: 139 MMOL/L (ref 136–145)
TOTAL PROTEIN: 5.2 G/DL (ref 6.4–8.2)
WBC # BLD: 6.4 K/UL (ref 4–11)

## 2022-07-09 PROCEDURE — 94761 N-INVAS EAR/PLS OXIMETRY MLT: CPT

## 2022-07-09 PROCEDURE — 2580000003 HC RX 258

## 2022-07-09 PROCEDURE — 6370000000 HC RX 637 (ALT 250 FOR IP)

## 2022-07-09 PROCEDURE — 36415 COLL VENOUS BLD VENIPUNCTURE: CPT

## 2022-07-09 PROCEDURE — 80053 COMPREHEN METABOLIC PANEL: CPT

## 2022-07-09 PROCEDURE — 99233 SBSQ HOSP IP/OBS HIGH 50: CPT | Performed by: INTERNAL MEDICINE

## 2022-07-09 PROCEDURE — 85025 COMPLETE CBC W/AUTO DIFF WBC: CPT

## 2022-07-09 PROCEDURE — 6360000002 HC RX W HCPCS: Performed by: INTERNAL MEDICINE

## 2022-07-09 PROCEDURE — 1200000000 HC SEMI PRIVATE

## 2022-07-09 PROCEDURE — 94640 AIRWAY INHALATION TREATMENT: CPT

## 2022-07-09 RX ORDER — KETOROLAC TROMETHAMINE 30 MG/ML
30 INJECTION, SOLUTION INTRAMUSCULAR; INTRAVENOUS ONCE
Status: COMPLETED | OUTPATIENT
Start: 2022-07-09 | End: 2022-07-09

## 2022-07-09 RX ORDER — DIPHENHYDRAMINE HYDROCHLORIDE 50 MG/ML
25 INJECTION INTRAMUSCULAR; INTRAVENOUS ONCE
Status: COMPLETED | OUTPATIENT
Start: 2022-07-09 | End: 2022-07-09

## 2022-07-09 RX ORDER — PROCHLORPERAZINE EDISYLATE 5 MG/ML
10 INJECTION INTRAMUSCULAR; INTRAVENOUS ONCE
Status: COMPLETED | OUTPATIENT
Start: 2022-07-09 | End: 2022-07-09

## 2022-07-09 RX ADMIN — PANTOPRAZOLE SODIUM 40 MG: 40 TABLET, DELAYED RELEASE ORAL at 05:50

## 2022-07-09 RX ADMIN — DIPHENHYDRAMINE HYDROCHLORIDE 25 MG: 50 INJECTION, SOLUTION INTRAMUSCULAR; INTRAVENOUS at 20:58

## 2022-07-09 RX ADMIN — Medication 2 PUFF: at 18:50

## 2022-07-09 RX ADMIN — SODIUM CHLORIDE: 9 INJECTION, SOLUTION INTRAVENOUS at 20:57

## 2022-07-09 RX ADMIN — KETOROLAC TROMETHAMINE 30 MG: 30 INJECTION, SOLUTION INTRAMUSCULAR at 20:58

## 2022-07-09 RX ADMIN — PROCHLORPERAZINE EDISYLATE 10 MG: 5 INJECTION INTRAMUSCULAR; INTRAVENOUS at 20:59

## 2022-07-09 RX ADMIN — SODIUM CHLORIDE: 9 INJECTION, SOLUTION INTRAVENOUS at 11:42

## 2022-07-09 RX ADMIN — Medication 2 PUFF: at 07:11

## 2022-07-09 ASSESSMENT — PAIN SCALES - GENERAL
PAINLEVEL_OUTOF10: 0
PAINLEVEL_OUTOF10: 6
PAINLEVEL_OUTOF10: 10

## 2022-07-09 ASSESSMENT — PAIN - FUNCTIONAL ASSESSMENT: PAIN_FUNCTIONAL_ASSESSMENT: ACTIVITIES ARE NOT PREVENTED

## 2022-07-09 ASSESSMENT — PAIN DESCRIPTION - DESCRIPTORS: DESCRIPTORS: ACHING;THROBBING

## 2022-07-09 ASSESSMENT — PAIN DESCRIPTION - LOCATION: LOCATION: OTHER (COMMENT)

## 2022-07-09 NOTE — PROGRESS NOTES
Gastroenterology Progress Note    Patient:   Jase Cope   :    1980   Facility:   [facility]  Referring/PCP: No primary care provider on file. Date:     2022  Consultant:   GARFIELD Brar      Chief Complaint   Patient presents with    Other     reports seen PCP and had blood work done, dx'd with Hep B & C reports liver function is worse than its ever been hasn't been able to keep anything down. History of Present illness     43year old male with a history of COPD, headaches, arthritis, and acute HBV presented to the ED with elevated LFTs. He was admitted from - with acute hepatitis B, elevated LFTs, and abdominal pain. He admits to mental fog, jaundice, dizziness, itching, blurred vision, nausea, heartburn, RUQ and epigastric abdominal pain, cramping, bloating, decreased appetite, weight loss, and increased stool frequency. His BMs are pale. He denies rash, vomiting, dysphagia, melena, and rectal bleeding. He uses medical marijuana daily for anxiety relief and appetite stimulation. He admits to family history of colon cancer in his father who was diagnosed in his 52's. He has never had an EGD or colonoscopy. GI was consulted for evaluation of acute HBV. Past Medical History:   Diagnosis Date    Arthritis     COPD (chronic obstructive pulmonary disease) ([facility] [address]     Liver damage      No past surgical history on file. Social:   Social History     Tobacco Use    Smoking status: Current Every Day Smoker     Packs/day: 0.50    Smokeless tobacco: Never Used   Substance Use Topics    Alcohol use: Yes     Comment: social     Family:   Family History   Problem Relation Age of Onset    Cancer Father     Heart Disease Paternal Grandfather      No current facility-administered medications on file prior to encounter.      Current Outpatient Medications on File Prior to Encounter   Medication Sig Dispense Refill    albuterol sulfate HFA (PROVENTIL;VENTOLIN;PROAIR) 108 (90 Base) MCG/ACT inhaler Inhale 2 puffs into the lungs every 4 hours as needed for Shortness of Breath or Wheezing      SYMBICORT 160-4.5 MCG/ACT AERO Inhale 2 puffs into the lungs 2 times daily      pantoprazole (PROTONIX) 40 MG tablet Take 1 tablet by mouth every morning (before breakfast) 30 tablet 3    ondansetron (ZOFRAN ODT) 4 MG disintegrating tablet Take 1 tablet by mouth every 8 hours as needed for Nausea or Vomiting 20 tablet 0      Infusions:    sodium chloride      sodium chloride 100 mL/hr at 07/09/22 0514     PRN Medications: sodium chloride flush, sodium chloride, potassium chloride **OR** potassium alternative oral replacement **OR** potassium chloride, magnesium sulfate, ondansetron **OR** ondansetron, polyethylene glycol, albuterol sulfate HFA, melatonin  Allergies: Allergies   Allergen Reactions    Other      ultram       ROS:   Constitutional: negative for chills, fevers and sweats  Eyes: negative for cataracts, and redness. Positive for icterus. Ears, nose, mouth, throat, and face: negative for epistaxis, hearing loss and sore throat  Respiratory: negative for cough, hemoptysis and sputum  Cardiovascular: negative for chest pain, dyspnea and lower extremity edema  Gastrointestinal: as per HPI  Genitourinary:negative for dysuria, frequency and hematuria  Neurological: negative for coordination problems, and gait problems. Positive for dizziness.    Behavioral/Psych: negative for anxiety, depression and mood swings    Physical Exam   /61   Pulse 53   Temp 98.5 °F (36.9 °C) (Oral)   Resp 16   Ht 5' 7\" (1.702 m)   Wt 165 lb (74.8 kg)   SpO2 97%   BMI 25.84 kg/m²       General appearance: alert, appears stated age, cooperative, fatigued, icteric, slowed mentation and syndromic appearance - ill-appearing  Head: Normocephalic, without obvious abnormality, atraumatic  Eyes: positive findings: sclera icteric  Neck: no adenopathy and supple, symmetrical, trachea midline  Lungs: clear to auscultation bilaterally  Heart: regular rate and rhythm, S1, S2 normal, no murmur, click, rub or gallop  Abdomen: normal findings: bowel sounds normal, no masses palpable and symmetric and abnormal findings:  distended and tenderness mild in the epigastrium and in the RUQ  Extremities: extremities normal, atraumatic, no cyanosis or edema    Lab and Imaging Review   Labs:  CBC:   Recent Labs     07/08/22  0947 07/09/22  0537   WBC 6.9 6.4   HGB 13.5 12.0*   HCT 39.5* 35.3*   MCV 89.4 88.4    214     BMP:   Recent Labs     07/08/22  0947 07/09/22  0537    139   K 3.5 3.6    106   CO2 21 23   BUN 6* 7   CREATININE <0.5* <0.5*     LIVER PROFILE:   Recent Labs     07/08/22  0947 07/09/22  0537   AST 1,072* 695*   ALT 1,635* 1,133*   LIPASE 24.0  --    PROT 6.0* 5.2*   BILITOT 16.8* 12.4*   ALKPHOS 270* 236*     PT/INR:   Recent Labs     07/08/22  0947   INR 1.16*       IMAGING:  CT ABDOMEN PELVIS W IV CONTRAST - 6/27/2022  Impression   CT findings in correlation with the prior gallbladder ultrasound findings are   highly suspicious for acute acalculous cholecystitis.  Clinical correlation   and follow-up nuclear medicine hepatobiliary imaging study would be helpful. US GALLBLADDER RUQ - 6/27/2022  Impression   Gallbladder wall thickening with possible gallbladder wall polyps, no   definite gallstones.  No pericholecystic fluid.  Negative sonographic   Ruth's sign.       Common bile duct not visualized     Attending Supervising [de-identified] Attestation Statement  The patient is a 43 y.o. male. I have performed a history and physical examination of the patient. I discussed the case with my physician assistant Loistine Spatz PA-C    I reviewed the patient's Past Medical History, Past Surgical History, Medications, and Allergies.      Physical Exam:  Vitals:    07/08/22 1910 07/09/22 0140 07/09/22 0711 07/09/22 0909   BP: 116/65 107/65  110/61   Pulse: 57 58 51 53   Resp: 18 18 16 16   Temp: 98.2 °F (36.8 °C) 98.2 °F (36.8 °C)  98.5 °F (36.9 °C)   TempSrc: Oral Oral  Oral   SpO2: 97% 98% 98% 97%   Weight:       Height:           Physical Examination: General appearance - alert, well appearing, and in mild distress  Mental status - alert, oriented to person, place, and time  Eyes - sclera icteric  Neck - supple, no significant adenopathy  Chest - no tachypnea, retractions or cyanosis  Heart - normal rate and regular rhythm  Abdomen - soft, nontender, nondistended, no masses or organomegaly  Extremities - no pedal edema noted       Assessment:     43year old male with a history of COPD, headaches, arthritis, and acute HBV admitted with acute HBV. Plan:   1. Continue supportive care  2. Monitor LFTs  3. OK to D/C if LFT's and INR are stable, then will follow in office to evaluate for the unlikely development of chronic hepatitis  4. Encourage nutrition  5.  Will follow    Elsy Chavez MD       O) [phone]

## 2022-07-09 NOTE — PLAN OF CARE
Problem: Discharge Planning  Goal: Discharge to home or other facility with appropriate resources  Outcome: Progressing  Flowsheets (Taken 7/9/2022 0255 by Caridad Gastelum RN)  Discharge to home or other facility with appropriate resources:   Identify barriers to discharge with patient and caregiver   Identify discharge learning needs (meds, wound care, etc)   Refer to discharge planning if patient needs post-hospital services based on physician order or complex needs related to functional status, cognitive ability or social support system   Arrange for needed discharge resources and transportation as appropriate   Arrange for interpreters to assist at discharge as needed     Problem: Pain  Goal: Verbalizes/displays adequate comfort level or baseline comfort level  Outcome: Progressing     Problem: Safety - Adult  Goal: Free from fall injury  Outcome: Progressing

## 2022-07-09 NOTE — FLOWSHEET NOTE
07/09/22 0909   Vital Signs   Temp 98.5 °F (36.9 °C)   Temp Source Oral   Heart Rate 53   Heart Rate Source Monitor   Resp 16   /61   BP Location Right upper arm   BP Method Automatic   MAP (Calculated) 77.33   Patient Position Semi fowlers   Level of Consciousness Alert (0)   MEWS Score 1   Pain Assessment   Pain Assessment 0-10   Pain Level 6   Oxygen Therapy   SpO2 97 %   O2 Device None (Room air)   Shift assessment complete - See flow sheet. Medications given - See [facility]     Patient with no complaints of pain at this time. Patient denies any further needs. Bed alarm is /Deferred for low/med risk, A/O with steady gait   Call light in reach  Bedside Mobility Assessment Tool (BMAT):     Assessment Level 1- Sit and Shake    1. From a semi-reclined position, ask patient to sit up and rotate to a seated position at the side of the bed. Can use the bedrail. 2. Ask patient to reach out and grab your hand and shake making sure patient reaches across his/her midline. Pass- Patient is able to come to a seated position, maintain core strength. Maintains seated balance while reaching across midline. Move on to Assessment Level 2. Assessment Level 2- Stretch and Point   1. With patient in seated position at the side of the bed, have patient place both feet on the floor (or stool) with knees no higher than hips. 2. Ask patient to stretch one leg and straighten the knee, then bend the ankle/flex and point the toes. If appropriate, repeat with the other leg. Pass- Patient is able to demonstrate appropriate quad strength on intended weight bearing limb(s). Move onto Assessment Level 3. Assessment Level 3- Stand   1. Ask patient to elevate off the bed or chair (seated to standing) using an assistive device (cane, bedrail). 2. Patient should be able to raise buttocks off be and hold for a count of five. May repeat once.    Pass- Patient maintains standing stability for at least 5 seconds, proceed to assessment level 4. Assessment Level 4- Walk   1. Ask patient to march in place at bedside. 2. Then ask patient to advance step and return each foot. Some medical conditions may render a patient from stepping backwards, use your best clinical judgement. Pass- Patient demonstrates balance while shifting weight and ability to step, takes independent steps, does not use assistive device patient is MOBILITY LEVEL 4.       Mobility Level- 4
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
## 2022-07-09 NOTE — ACP (ADVANCE CARE PLANNING)
Advance Care Planning     General Advance Care Planning (ACP) Conversation    Date of Conversation: 7/8/2022  Conducted with: Patient with Decision Making Capacity    Healthcare Decision Maker:    Primary Decision Maker: Karuna Carlson [facility] [phone]  Click here to complete [address] including selection of the Healthcare Decision Maker Relationship (ie \"Primary\"). Today we documented Decision Maker(s) consistent with Legal Next of Kin hierarchy. Content/Action Overview:   Has ACP document(s) on file - reflects the patient's care preferences  Reviewed DNR/DNI and patient elects Full Code (Attempt Resuscitation)      Length of Voluntary ACP Conversation in minutes:  <16 minutes (Non-Billable)    Wilson Frederick RN

## 2022-07-10 VITALS
BODY MASS INDEX: 25.9 KG/M2 | HEIGHT: 67 IN | HEART RATE: 52 BPM | TEMPERATURE: 97.5 F | RESPIRATION RATE: 16 BRPM | DIASTOLIC BLOOD PRESSURE: 65 MMHG | SYSTOLIC BLOOD PRESSURE: 116 MMHG | WEIGHT: 165 LBS | OXYGEN SATURATION: 97 %

## 2022-07-10 LAB
A/G RATIO: 1.3 (ref 1.1–2.2)
ALBUMIN SERPL-MCNC: 3 G/DL (ref 3.4–5)
ALP BLD-CCNC: 247 U/L (ref 40–129)
ALT SERPL-CCNC: 936 U/L (ref 10–40)
ANION GAP SERPL CALCULATED.3IONS-SCNC: 9 MMOL/L (ref 3–16)
ANISOCYTOSIS: ABNORMAL
AST SERPL-CCNC: 567 U/L (ref 15–37)
BASOPHILS ABSOLUTE: 0 K/UL (ref 0–0.2)
BASOPHILS RELATIVE PERCENT: 0 %
BILIRUB SERPL-MCNC: 10.6 MG/DL (ref 0–1)
BUN BLDV-MCNC: 9 MG/DL (ref 7–20)
CALCIUM SERPL-MCNC: 8.9 MG/DL (ref 8.3–10.6)
CHLORIDE BLD-SCNC: 108 MMOL/L (ref 99–110)
CO2: 23 MMOL/L (ref 21–32)
CREAT SERPL-MCNC: <0.5 MG/DL (ref 0.9–1.3)
EOSINOPHILS ABSOLUTE: 0.3 K/UL (ref 0–0.6)
EOSINOPHILS RELATIVE PERCENT: 5 %
GFR AFRICAN AMERICAN: >60
GFR NON-AFRICAN AMERICAN: >60
GLUCOSE BLD-MCNC: 95 MG/DL (ref 70–99)
HCT VFR BLD CALC: 34.6 % (ref 40.5–52.5)
HEMOGLOBIN: 12.2 G/DL (ref 13.5–17.5)
HYPOCHROMIA: ABNORMAL
LYMPHOCYTES ABSOLUTE: 1 K/UL (ref 1–5.1)
LYMPHOCYTES RELATIVE PERCENT: 15 %
MCH RBC QN AUTO: 31.1 PG (ref 26–34)
MCHC RBC AUTO-ENTMCNC: 35.3 G/DL (ref 31–36)
MCV RBC AUTO: 88.1 FL (ref 80–100)
MONOCYTES ABSOLUTE: 0.1 K/UL (ref 0–1.3)
MONOCYTES RELATIVE PERCENT: 2 %
NEUTROPHILS ABSOLUTE: 5.4 K/UL (ref 1.7–7.7)
NEUTROPHILS RELATIVE PERCENT: 78 %
NUCLEATED RED BLOOD CELLS: 3 /100 WBC
PDW BLD-RTO: 21.1 % (ref 12.4–15.4)
PLATELET # BLD: 229 K/UL (ref 135–450)
PLATELET SLIDE REVIEW: ADEQUATE
PMV BLD AUTO: 9.5 FL (ref 5–10.5)
POTASSIUM REFLEX MAGNESIUM: 4.3 MMOL/L (ref 3.5–5.1)
RBC # BLD: 3.93 M/UL (ref 4.2–5.9)
SLIDE REVIEW: ABNORMAL
SODIUM BLD-SCNC: 140 MMOL/L (ref 136–145)
TOTAL PROTEIN: 5.4 G/DL (ref 6.4–8.2)
WBC # BLD: 6.9 K/UL (ref 4–11)

## 2022-07-10 PROCEDURE — 2580000003 HC RX 258

## 2022-07-10 PROCEDURE — 99239 HOSP IP/OBS DSCHRG MGMT >30: CPT | Performed by: INTERNAL MEDICINE

## 2022-07-10 PROCEDURE — 80053 COMPREHEN METABOLIC PANEL: CPT

## 2022-07-10 PROCEDURE — 94640 AIRWAY INHALATION TREATMENT: CPT

## 2022-07-10 PROCEDURE — 85025 COMPLETE CBC W/AUTO DIFF WBC: CPT

## 2022-07-10 PROCEDURE — 94761 N-INVAS EAR/PLS OXIMETRY MLT: CPT

## 2022-07-10 PROCEDURE — 36415 COLL VENOUS BLD VENIPUNCTURE: CPT

## 2022-07-10 PROCEDURE — 6370000000 HC RX 637 (ALT 250 FOR IP)

## 2022-07-10 RX ADMIN — Medication 2 PUFF: at 12:22

## 2022-07-10 RX ADMIN — Medication 2 PUFF: at 07:05

## 2022-07-10 RX ADMIN — SODIUM CHLORIDE: 9 INJECTION, SOLUTION INTRAVENOUS at 05:44

## 2022-07-10 RX ADMIN — PANTOPRAZOLE SODIUM 40 MG: 40 TABLET, DELAYED RELEASE ORAL at 05:44

## 2022-07-10 ASSESSMENT — PAIN DESCRIPTION - LOCATION: LOCATION: ABDOMEN

## 2022-07-10 ASSESSMENT — PAIN SCALES - GENERAL: PAINLEVEL_OUTOF10: 5

## 2022-07-10 NOTE — PROGRESS NOTES
Hospitalist Progress Note      PCP: No primary care provider on file. Date of Admission: 7/8/2022    Subjective: LF improving, feeling better    Medications:  Reviewed    Infusion Medications    sodium chloride      sodium chloride 100 mL/hr at 07/10/22 0604     Scheduled Medications    sodium chloride flush  5-40 mL IntraVENous 2 times per day    enoxaparin  40 mg SubCUTAneous Daily    pantoprazole  40 mg Oral QAM AC    budesonide-formoterol  2 puff Inhalation BID     PRN Meds: sodium chloride flush, sodium chloride, potassium chloride **OR** potassium alternative oral replacement **OR** potassium chloride, magnesium sulfate, ondansetron **OR** ondansetron, polyethylene glycol, albuterol sulfate HFA, melatonin      Intake/Output Summary (Last 24 hours) at 7/10/2022 0933  Last data filed at 7/10/2022 0604  Gross per 24 hour   Intake 2732.63 ml   Output 900 ml   Net 1832.63 ml       Physical Exam Performed:    /65   Pulse 52   Temp 97.5 °F (36.4 °C) (Oral)   Resp 16   Ht 5' 7\" (1.702 m)   Wt 165 lb (74.8 kg)   SpO2 97%   BMI 25.84 kg/m²       Gen: No distress. Alert. Eyes: PERRL. + sclera icterus. No conjunctival injection. ENT: No discharge. Pharynx clear. Neck: No JVD. Trachea midline. Resp: No accessory muscle use. No crackles. No wheezes. No rhonchi. CV: Regular rate. Regular rhythm. No murmur. No rub. No edema. Capillary Refill: Brisk,< 3 seconds   Peripheral Pulses: +2 palpable, equal bilaterally   GI: Diffuse TTP worse in RUQ. Negative Ruth's sign. Non-distended. No masses. No organomegaly. Normal bowel sounds. No hernia. Skin: Warm and dry. No nodule on exposed extremities. No rash on exposed extremities. + Diffuse jaundice  M/S: No cyanosis. No joint deformity. No clubbing. Neuro: Awake. Grossly nonfocal    Psych: Oriented x 3. No anxiety or agitation.      Labs:   Recent Labs     07/08/22  0947 07/09/22  0537 07/10/22  0534   WBC 6.9 6.4 6.9   HGB 13.5 12.0* 12.2*   HCT 39.5* 35.3* 34.6*    214 229     Recent Labs     07/08/22  0947 07/09/22  0537 07/10/22  0534    139 140   K 3.5 3.6 4.3    106 108   CO2 21 23 23   BUN 6* 7 9   CREATININE <0.5* <0.5* <0.5*   CALCIUM 8.9 8.4 8.9     Recent Labs     07/08/22  0947 07/09/22  0537 07/10/22  0534   AST 1,072* 695* 567*   ALT 1,635* 1,133* 936*   BILITOT 16.8* 12.4* 10.6*   ALKPHOS 270* 236* 247*     Recent Labs     07/08/22  0947   INR 1.16*     No results for input(s): CKTOTAL, TROPONINI in the last 72 hours. Urinalysis:      Lab Results   Component Value Date/Time    NITRU Negative 07/08/2022 09:47 AM    BLOODU Negative 07/08/2022 09:47 AM    SPECGRAV 1.025 07/08/2022 09:47 AM    GLUCOSEU Negative 07/08/2022 09:47 AM       Radiology:  No orders to display           Assessment/Plan:    Active Hospital Problems    Diagnosis     Acute viral hepatitis B without coma and without delta agent [B16.9]      Priority: Medium    Elevated liver enzymes [R74.8]      Priority: Medium    Nausea [R11.0]      Priority: Medium    Decreased oral intake [R63.8]      Priority: Medium    COPD without exacerbation ([facility] [address] [J44.9]      Priority: Medium    Anxiety [F41.9]      Priority: Medium    Hyperbilirubinemia [E80.6]      Priority: Medium         #Acute hepatitis B infection  #Transaminitis   #Hyperbilirubinemia   -LFTs stable from previous admission, AST/ALT improved  -Bilirubin trend up 16.8-->improved to 12.4  -Hepatitis panel above  6/28/2022 14:59  HBV Quantitative, log IU/ML: 6.94  HBV Quantitative, IU/ML: 7,954,472  -IV PPI  -Monitor CMP  -GI consulted     #COPD without exacerbation  -Continue home inhalers     #Anxiety  -Previously treated for this, no home meds at this time  -In process of reestablishing with psych OP       DVT Prophylaxis: Lovenox  Diet: ADULT DIET;  Regular; Low Fat (less than or equal to 50 gm/day)  ADULT ORAL NUTRITION SUPPLEMENT; Breakfast, Lunch, Dinner; Clear Liquid Oral Supplement  Code Status: Full Code    PT/OT Eval Status: ordered    Olamide Bearden MD

## 2022-07-10 NOTE — PROGRESS NOTES
Gastroenterology Progress Note    Patient:   Krissy Pacheco   :    1980   Facility:   [facility]  Referring/PCP: No primary care provider on file. Date:     7/10/2022  Consultant:   GARFIELD Soares      Chief Complaint   Patient presents with    Other     reports seen PCP and had blood work done, dx'd with Hep B & C reports liver function is worse than its ever been hasn't been able to keep anything down. History of Present illness     43year old male with a history of COPD, headaches, arthritis, and acute HBV presented to the ED with elevated LFTs. He was admitted from - with acute hepatitis B, elevated LFTs, and abdominal pain. He admits to mental fog, jaundice, dizziness, itching, blurred vision, nausea, heartburn, RUQ and epigastric abdominal pain, cramping, bloating, decreased appetite, weight loss, and increased stool frequency. His BMs are pale. He denies rash, vomiting, dysphagia, melena, and rectal bleeding. He uses medical marijuana daily for anxiety relief and appetite stimulation. He admits to family history of colon cancer in his father who was diagnosed in his 52's. He has never had an EGD or colonoscopy. GI was consulted for evaluation of acute HBV. Past Medical History:   Diagnosis Date    Arthritis     COPD (chronic obstructive pulmonary disease) ([facility] [address]     Liver damage      No past surgical history on file. Social:   Social History     Tobacco Use    Smoking status: Current Every Day Smoker     Packs/day: 0.50    Smokeless tobacco: Never Used   Substance Use Topics    Alcohol use: Yes     Comment: social     Family:   Family History   Problem Relation Age of Onset    Cancer Father     Heart Disease Paternal Grandfather      No current facility-administered medications on file prior to encounter.      Current Outpatient Medications on File Prior to Encounter   Medication Sig Dispense Refill    albuterol sulfate HFA (PROVENTIL;VENTOLIN;PROAIR) 108 (90 Base) MCG/ACT inhaler Inhale 2 puffs into the lungs every 4 hours as needed for Shortness of Breath or Wheezing      SYMBICORT 160-4.5 MCG/ACT AERO Inhale 2 puffs into the lungs 2 times daily      pantoprazole (PROTONIX) 40 MG tablet Take 1 tablet by mouth every morning (before breakfast) 30 tablet 3    ondansetron (ZOFRAN ODT) 4 MG disintegrating tablet Take 1 tablet by mouth every 8 hours as needed for Nausea or Vomiting 20 tablet 0      Infusions:    sodium chloride      sodium chloride 100 mL/hr at 07/10/22 0604     PRN Medications: sodium chloride flush, sodium chloride, potassium chloride **OR** potassium alternative oral replacement **OR** potassium chloride, magnesium sulfate, ondansetron **OR** ondansetron, polyethylene glycol, albuterol sulfate HFA, melatonin  Allergies: Allergies   Allergen Reactions    Other      ultram       ROS:   Constitutional: negative for chills, fevers and sweats  Eyes: negative for cataracts, and redness. Positive for icterus. Ears, nose, mouth, throat, and face: negative for epistaxis, hearing loss and sore throat  Respiratory: negative for cough, hemoptysis and sputum  Cardiovascular: negative for chest pain, dyspnea and lower extremity edema  Gastrointestinal: as per HPI  Genitourinary:negative for dysuria, frequency and hematuria  Neurological: negative for coordination problems, and gait problems. Positive for dizziness.    Behavioral/Psych: negative for anxiety, depression and mood swings    Physical Exam   /63   Pulse 58   Temp 98.2 °F (36.8 °C) (Oral)   Resp 16   Ht 5' 7\" (1.702 m)   Wt 165 lb (74.8 kg)   SpO2 97%   BMI 25.84 kg/m²       General appearance: alert, appears stated age, cooperative, fatigued, icteric, slowed mentation and syndromic appearance - ill-appearing  Head: Normocephalic, without obvious abnormality, atraumatic  Eyes: positive findings: sclera icteric  Neck: no adenopathy and supple, 1433 07/09/22 1850 07/09/22 1931 07/10/22 0710   BP: 100/64  108/63    Pulse: 57  58    Resp: 16  18 16   Temp: 98.5 °F (36.9 °C)  98.2 °F (36.8 °C)    TempSrc: Oral  Oral    SpO2: 99% 97% 94% 97%   Weight:       Height:           Physical Examination: General appearance - alert, well appearing, and in mild distress  Mental status - alert, oriented to person, place, and time  Eyes - sclera icteric  Neck - supple, no significant adenopathy  Chest - no tachypnea, retractions or cyanosis  Heart - normal rate and regular rhythm  Abdomen - soft, nontender, nondistended, no masses or organomegaly  Extremities - no pedal edema noted       Assessment:     43year old male with a history of COPD, headaches, arthritis, and acute HBV admitted with acute HBV. Plan:   1. Continue supportive care  2. Monitor LFTs  3. OK to D/C since LFT's are improving and INR stable, then will follow in office to evaluate for the unlikely development of chronic hepatitis  4. Encourage nutrition  5.  Will follow    Elsy Chavez MD       O) [phone]

## 2022-07-14 NOTE — H&P
Pt was seen in ER at [facility] and was recommended admission at [facility] but never showed up   Pt was not seen

## 2022-07-14 NOTE — H&P
Pt was seen in ER at [facility] and was recommended admission at [facility] but never showed up   Pt was not seen

## 2022-08-09 NOTE — DISCHARGE SUMMARY
tattoos. He endorses unprotected sex with a few women within the last 3 months he thinks. His dad accompanies him and states he knows that \"some of the women he's with shoot up\". #Acute hepatitis B infection  #Transaminitis  #Hyperbilirubinemia  -LFTs stable from previous admission, AST/ALT improved  -Bilirubin trend up 16.8-->improved to 12.4  -Hepatitis panel above  6/28/2022 14:59  HBV Quantitative, log IU/ML: 6.94  HBV Quantitative, IU/ML: 1,699,983  -IV PPI  -Monitor CMP  -GI consulted     #COPD without exacerbation  -Continue home inhalers     #Anxiety  -Previously treated for this, no home meds at this time  -In process of reestablishing with psych OP       Physical Exam Performed:     /65   Pulse 52   Temp 97.5 °F (36.4 °C) (Oral)   Resp 16   Ht 5' 7\" (1.702 m)   Wt 165 lb (74.8 kg)   SpO2 97%   BMI 25.84 kg/m²     Gen: No distress. Alert. Eyes: PERRL. + sclera icterus. No conjunctival injection. ENT: No discharge. Pharynx clear. Neck: No JVD. Trachea midline. Resp: No accessory muscle use. No crackles. No wheezes. No rhonchi. CV: Regular rate. Regular rhythm. No murmur. No rub. No edema. Capillary Refill: Brisk,< 3 seconds  Peripheral Pulses: +2 palpable, equal bilaterally  GI: Diffuse TTP worse in RUQ. Negative Ruth's sign. Non-distended. No masses. No organomegaly. Normal bowel sounds. No hernia. Skin: Warm and dry. No nodule on exposed extremities. No rash on exposed extremities. + Diffuse jaundice  M/S: No cyanosis. No joint deformity. No clubbing. Neuro: Awake. Grossly nonfocal    Psych: Oriented x 3. No anxiety or agitation. Labs:  For convenience and continuity at follow-up the following most recent labs are provided:      CBC:    Lab Results   Component Value Date/Time    WBC 6.9 07/10/2022 05:34 AM    HGB 12.2 07/10/2022 05:34 AM    HCT 34.6 07/10/2022 05:34 AM     07/10/2022 05:34 AM       Renal:    Lab Results   Component Value Date/Time     07/10/2022 05:34 AM    K 4.3 07/10/2022 05:34 AM     07/10/2022 05:34 AM    CO2 23 07/10/2022 05:34 AM    BUN 9 07/10/2022 05:34 AM    CREATININE <0.5 07/10/2022 05:34 AM    CALCIUM 8.9 07/10/2022 05:34 AM         Significant Diagnostic Studies    Radiology:   No orders to display          Consults:     IP CONSULT TO GI  IP CONSULT TO HOSPITALIST  IP CONSULT TO GI    Disposition:  home     Condition at Discharge: Stable    Discharge Instructions/Follow-up:  pcp in 1 week    Code Status:  Prior     Activity: activity as tolerated    Diet: regular diet      Discharge Medications:     Discharge Medication List as of 7/10/2022 11:27 AM             Details   albuterol sulfate HFA (PROVENTIL;VENTOLIN;PROAIR) 108 (90 Base) MCG/ACT inhaler Inhale 2 puffs into the lungs every 4 hours as needed for Shortness of Breath or WheezingHistorical Med      SYMBICORT 160-4.5 MCG/ACT AERO Inhale 2 puffs into the lungs 2 times daily, DAWHistorical Med      pantoprazole (PROTONIX) 40 MG tablet Take 1 tablet by mouth every morning (before breakfast), Disp-30 tablet, R-3Print      ondansetron (ZOFRAN ODT) 4 MG disintegrating tablet Take 1 tablet by mouth every 8 hours as needed for Nausea or Vomiting, Disp-20 tablet, R-0Print             Time Spent on discharge is more than 30 minutes in the examination, evaluation, counseling and review of medications and discharge plan. Signed:    Hector Schreiber MD   8/8/2022      Thank you No primary care provider on file. for the opportunity to be involved in this patient's care. If you have any questions or concerns, please feel free to contact me at [phone].

## 2023-01-09 ENCOUNTER — TELEPHONE (OUTPATIENT)
Dept: PULMONOLOGY | Age: 43
End: 2023-01-09

## 2023-01-09 NOTE — TELEPHONE ENCOUNTER
Patient did not show for NP  with Dr. Saida Yoon on 1/9/23. Reason:  na    This is patient's first no show. Patient was ano show on: na.      Patient did not reschedule.   Reschedule date:  na